# Patient Record
Sex: FEMALE | Race: WHITE | NOT HISPANIC OR LATINO | Employment: PART TIME | URBAN - METROPOLITAN AREA
[De-identification: names, ages, dates, MRNs, and addresses within clinical notes are randomized per-mention and may not be internally consistent; named-entity substitution may affect disease eponyms.]

---
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
## 2021-03-22 ENCOUNTER — OFFICE VISIT (OUTPATIENT)
Dept: PEDIATRIC HEMATOLOGY/ONCOLOGY | Facility: CLINIC | Age: 21
End: 2021-03-22
Attending: NURSE PRACTITIONER
Payer: MEDICAID

## 2021-03-22 VITALS
OXYGEN SATURATION: 100 % | SYSTOLIC BLOOD PRESSURE: 131 MMHG | WEIGHT: 198 LBS | TEMPERATURE: 98.5 F | RESPIRATION RATE: 24 BRPM | HEIGHT: 69 IN | DIASTOLIC BLOOD PRESSURE: 83 MMHG | HEART RATE: 122 BPM | BODY MASS INDEX: 29.33 KG/M2

## 2021-03-22 DIAGNOSIS — Z92.21 STATUS POST CHEMOTHERAPY: ICD-10-CM

## 2021-03-22 DIAGNOSIS — Z80.6 FAMILY HISTORY OF ACUTE LYMPHOID LEUKEMIA: ICD-10-CM

## 2021-03-22 DIAGNOSIS — M25.561 BILATERAL CHRONIC KNEE PAIN: ICD-10-CM

## 2021-03-22 DIAGNOSIS — C91.01 ACUTE LYMPHOBLASTIC LEUKEMIA (ALL) IN REMISSION (H): Primary | ICD-10-CM

## 2021-03-22 DIAGNOSIS — G89.29 BILATERAL CHRONIC KNEE PAIN: ICD-10-CM

## 2021-03-22 DIAGNOSIS — M25.562 BILATERAL CHRONIC KNEE PAIN: ICD-10-CM

## 2021-03-22 DIAGNOSIS — Z91.89 AT RISK FOR OSTEOPENIA: ICD-10-CM

## 2021-03-22 DIAGNOSIS — Z91.89 AT RISK FOR CARDIOMYOPATHY: ICD-10-CM

## 2021-03-22 LAB
ALBUMIN SERPL-MCNC: 3.8 G/DL (ref 3.4–5)
ALP SERPL-CCNC: 104 U/L (ref 40–150)
ALT SERPL W P-5'-P-CCNC: 20 U/L (ref 0–50)
ANION GAP SERPL CALCULATED.3IONS-SCNC: 6 MMOL/L (ref 3–14)
AST SERPL W P-5'-P-CCNC: 14 U/L (ref 0–35)
BASOPHILS # BLD AUTO: 0 10E9/L (ref 0–0.2)
BASOPHILS NFR BLD AUTO: 0.4 %
BILIRUB SERPL-MCNC: 0.5 MG/DL (ref 0.2–1.3)
BUN SERPL-MCNC: 10 MG/DL (ref 7–30)
CALCIUM SERPL-MCNC: 9.2 MG/DL (ref 8.5–10.1)
CHLORIDE SERPL-SCNC: 108 MMOL/L (ref 96–110)
CO2 SERPL-SCNC: 24 MMOL/L (ref 20–32)
CREAT SERPL-MCNC: 0.55 MG/DL (ref 0.5–1)
DIFFERENTIAL METHOD BLD: NORMAL
EOSINOPHIL # BLD AUTO: 0.3 10E9/L (ref 0–0.7)
EOSINOPHIL NFR BLD AUTO: 2.9 %
ERYTHROCYTE [DISTWIDTH] IN BLOOD BY AUTOMATED COUNT: 12.2 % (ref 10–15)
GFR SERPL CREATININE-BSD FRML MDRD: >90 ML/MIN/{1.73_M2}
GLUCOSE SERPL-MCNC: 113 MG/DL (ref 70–99)
HCT VFR BLD AUTO: 41 % (ref 35–47)
HGB BLD-MCNC: 13.7 G/DL (ref 11.7–15.7)
IMM GRANULOCYTES # BLD: 0 10E9/L (ref 0–0.4)
IMM GRANULOCYTES NFR BLD: 0.4 %
LYMPHOCYTES # BLD AUTO: 2 10E9/L (ref 0.8–5.3)
LYMPHOCYTES NFR BLD AUTO: 21 %
MCH RBC QN AUTO: 28.1 PG (ref 26.5–33)
MCHC RBC AUTO-ENTMCNC: 33.4 G/DL (ref 31.5–36.5)
MCV RBC AUTO: 84 FL (ref 78–100)
MONOCYTES # BLD AUTO: 0.5 10E9/L (ref 0–1.3)
MONOCYTES NFR BLD AUTO: 5.2 %
NEUTROPHILS # BLD AUTO: 6.5 10E9/L (ref 1.6–8.3)
NEUTROPHILS NFR BLD AUTO: 70.1 %
NRBC # BLD AUTO: 0 10*3/UL
NRBC BLD AUTO-RTO: 0 /100
PLATELET # BLD AUTO: 308 10E9/L (ref 150–450)
POTASSIUM SERPL-SCNC: 3.9 MMOL/L (ref 3.4–5.3)
PROT SERPL-MCNC: 7.6 G/DL (ref 6.8–8.8)
RBC # BLD AUTO: 4.87 10E12/L (ref 3.8–5.2)
SODIUM SERPL-SCNC: 138 MMOL/L (ref 133–144)
WBC # BLD AUTO: 9.3 10E9/L (ref 4–11)

## 2021-03-22 PROCEDURE — 36415 COLL VENOUS BLD VENIPUNCTURE: CPT | Performed by: NURSE PRACTITIONER

## 2021-03-22 PROCEDURE — 80053 COMPREHEN METABOLIC PANEL: CPT | Performed by: NURSE PRACTITIONER

## 2021-03-22 PROCEDURE — 99215 OFFICE O/P EST HI 40 MIN: CPT | Performed by: NURSE PRACTITIONER

## 2021-03-22 PROCEDURE — G0463 HOSPITAL OUTPT CLINIC VISIT: HCPCS

## 2021-03-22 PROCEDURE — 85025 COMPLETE CBC W/AUTO DIFF WBC: CPT | Performed by: NURSE PRACTITIONER

## 2021-03-22 ASSESSMENT — PAIN SCALES - GENERAL: PAINLEVEL: NO PAIN (0)

## 2021-03-22 ASSESSMENT — MIFFLIN-ST. JEOR: SCORE: 1737.5

## 2021-03-22 NOTE — NURSING NOTE
"Chief Complaint   Patient presents with     RECHECK     Patient is here today for ALL follow up     /83 (BP Location: Left arm, Patient Position: Fowlers, Cuff Size: Adult Large)   Pulse 122   Temp 98.5  F (36.9  C) (Oral)   Resp 24   Ht 1.753 m (5' 9\")   Wt 89.8 kg (198 lb)   SpO2 100%   BMI 29.24 kg/m      No Pain (0)  Data Unavailable    I have reviewed the patients medications and allergies    Height/weight double check needed? No    Peds Outpatient BP  1) Rested for 5 minutes, BP taken on bare arm, patient sitting (or supine for infants) w/ legs uncrossed?   Yes  2) Right arm used?  Left arm   Yes  3) Arm circumference of largest part of upper arm (in cm): 34  4) BP cuff sized used: Large Adult (32-43cm)   If used different size cuff then what was recommended why? suggested cuff is too small or short, used larger cuff size  5) First BP reading:machine   BP Readings from Last 1 Encounters:   03/22/21 131/83      Is reading >90%?No   (90% for <1 years is 90/50)  (90% for >18 years is 140/90)  *If a machine BP is at or above 90% take manual BP  6) Manual BP reading: N/A  7) Other comments: None          Maria D Shaw LPN  March 22, 2021  "

## 2021-03-22 NOTE — LETTER
"  3/22/2021      RE: Enoch Delgado  [id number] [facility] [id number]       Enoch \"Padmini\"  Danny is a 20  year old female with a history of acute lymphoblastic leukemia. She is here for her routine cancer survivorship evaluation.      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  She was on therapy until 5/27/2016.  She was treated on the [facility] protocol [id number]- VHR experimental arm 2.  She started therapy on 2/24/14 and was completed on 5/27/16.  She received the following chemotherapy on this regimen:  1.  Vincristine IV  2.  Daunorubicin IV with a cumulative dose of 100 mg/m2  3.  Prednisone PO  4.  PEG Asparaginase IV  5.  Cyclophosphamide IV with a cumulative dose of 5.4 GM/m2  6.  Cytarabine IV with a cumulative dose of 600 mg/m2; 42 mg/m2 IT  7.  6 Mercaptopurine PO  8.  Methotrexate PO, 21 GM/m2 (IV), 198 mg/m2 (IT)  9.  Erwinase IM  10.  Clofarabine IV  11.  Etoposide IV with a cumulative dose of 500 mg/m2  12.  Dexamethasone PO  13.  Doxorubicin IV with a cumulative dose of 75 mg/m2    Padmini DID NOT get radiation therapy    Ngas late effects known to date include:    1.  Prolonged neutropenia- resolved  2.  Left knee discoid lateral meniscus- 2/2015  3.  Hypogammaglobulinemia- resolved  4.  Menorrhagia  5.  Multiple lung infections- resolved    HISTORY OF PRESENT ILLNESS:  Padmini is here today with her mom and new 9 month old son.  She was last seen 7/2019.  Since her last visit, she became pregnant and gave birth to a healthy baby boy (Vadim) on 6/22/2020.  She was followed by OB and MFM.  She had a healthy pregnancy.  She has been doing well.  She has some aching in her bilateral knees off and on.  She feels like the knee pain has gotten worse as well. She did have surgery to her left knee for a torn meniscus in 2018.  She hasn't been back to orthopedics recently or had any imaging lately.  In the past she " had a negative x-ray.   She is not taking any pain medication currently and has just been dealing with the discomfort.   She is not currently on any birth control post delivery but thinking about getting an IUD placed.   She didn't like the way OCP made her feel.   She is working in an assisted living facility.  She hasn't had the COVID vaccine yet.  She has not had COVID 19 although she has taken care of patients with it and also was around her family who had it.   Antibody testing is also negative.  She is interested in meeting with genetics to talk about her son's risk for leukemia since both she and her mom had it.  She did have a cyst under her arm in the fall that was drained locally.  No HA.  No bleeding or bruising.  No fevers.  Hasn't been doing regular exercise but her job is physical.    Review of systems:  Comprehensive ROS negative except as stated in the HPI    PMH:   Past Medical History:   Diagnosis Date     ALL (acute lymphoblastic leukaemia)     very high risk (negative CNS; loss of 7q and ETV6, iAMP21 +) treated per [facility] protocol [id number]     [place] toe      PONV (postoperative nausea and vomiting)        PFMH:   Family History   Problem Relation Age of Onset     Cancer Mother 3        Acute lymphoblastic leukemia     Cancer Other         Hodgkin Lymphoma; Great Aunt     Diabetes Maternal Grandmother      Coronary Artery Disease Maternal Grandfather      Myocardial Infarction Maternal Grandfather        Social History: Padmini graduated from high school in 2019.  She started community college to pursue an associates degree in nursing.  She did take last year off due to her pregnancy but is back in school now.    Had a healthy baby boy in 6/2020.    Current Medications:   Current Outpatient Medications   Medication     cholecalciferol (VITAMIN D3) 10 mcg (400 units) TABS tablet     sertraline (ZOLOFT) 50 MG tablet     diclofenac (VOLTAREN) 75 MG EC tablet     No current facility-administered  "medications for this visit.        Physical Exam: /83 (BP Location: Left arm, Patient Position: Fowlers, Cuff Size: Adult Large)   Pulse 122   Temp 98.5  F (36.9  C) (Oral)   Resp 24   Ht 1.753 m (5' 9\")   Wt 89.8 kg (198 lb)   SpO2 100%   BMI 29.24 kg/m       Wt Readings from Last 3 Encounters:   03/22/21 89.8 kg (198 lb) (97 %, Z= 1.91)*   07/30/19 95.1 kg (209 lb 10.5 oz) (98 %, Z= 2.10)*   04/02/19 94.1 kg (207 lb 7.3 oz) (98 %, Z= 2.08)*     * Growth percentiles are based on CDC (Girls, 2-20 Years) data.     Ht Readings from Last 2 Encounters:   03/22/21 1.753 m (5' 9\") (97 %, Z= 1.85)*   07/30/19 1.759 m (5' 9.25\") (98 %, Z= 1.98)*     * Growth percentiles are based on CDC (Girls, 2-20 Years) data.     92 %ile (Z= 1.43) based on CDC (Girls, 2-20 Years) BMI-for-age based on BMI available as of 3/22/2021.      General: Enoch Delgado is alert, interactive and appropriate for age throughout exam.    Karnofsky/Lansky score is 100  HEENT: Skull is atrauamatic and normocephalic. PERRLA, sclera are non icteric and not injected, EOM are intact.  Nares are patent without drainage.  Oropharynx is clear without exudate, erythema or lesions.  Tympanic membranes are opaque bilaterally with light reflex and landmarks present.  Lymph:  Neck is supple without lymphadenopathy.  There is no supraclavicular, axillay or inguinal lymphadenopathy palpated.  Cardiovascular:  HR is regular, S1, S2 no murmur.  Capillary refill is < 2 seconds.  There is no edema.  Respiratory: Respirations are easy.  Lungs are clear to auscultation through out.  No crackles or wheezes.  Gastrointestinal:  BS present in all quadrants.  Abdomen is soft and non-tender.  No hepatosplenomegaly or masses are palpated.  Genitourinary: deferred  Skin: No rashes, bruises or other skin lesions are noted.   Neurological:  DTR are present and equal at patellas bilaterally.  Gait is normal.  Sensation intact in hands and feet.  Musculoskeletal:  Good " strength and ROM in all extremities.  Strong dorsiflexion at ankles bilaterally without any pain at the Achilles.    Labs:     Results for orders placed or performed in visit on 03/22/21   CBC with platelets differential     Status: None   Result Value Ref Range    WBC 9.3 4.0 - 11.0 10e9/L    RBC Count 4.87 3.8 - 5.2 10e12/L    Hemoglobin 13.7 11.7 - 15.7 g/dL    Hematocrit 41.0 35.0 - 47.0 %    MCV 84 78 - 100 fl    MCH 28.1 26.5 - 33.0 pg    MCHC 33.4 31.5 - 36.5 g/dL    RDW 12.2 10.0 - 15.0 %    Platelet Count 308 150 - 450 10e9/L    Diff Method Automated Method     % Neutrophils 70.1 %    % Lymphocytes 21.0 %    % Monocytes 5.2 %    % Eosinophils 2.9 %    % Basophils 0.4 %    % Immature Granulocytes 0.4 %    Nucleated RBCs 0 0 /100    Absolute Neutrophil 6.5 1.6 - 8.3 10e9/L    Absolute Lymphocytes 2.0 0.8 - 5.3 10e9/L    Absolute Monocytes 0.5 0.0 - 1.3 10e9/L    Absolute Eosinophils 0.3 0.0 - 0.7 10e9/L    Absolute Basophils 0.0 0.0 - 0.2 10e9/L    Abs Immature Granulocytes 0.0 0 - 0.4 10e9/L    Absolute Nucleated RBC 0.0    Comprehensive metabolic panel     Status: Abnormal   Result Value Ref Range    Sodium 138 133 - 144 mmol/L    Potassium 3.9 3.4 - 5.3 mmol/L    Chloride 108 96 - 110 mmol/L    Carbon Dioxide 24 20 - 32 mmol/L    Anion Gap 6 3 - 14 mmol/L    Glucose 113 (H) 70 - 99 mg/dL    Urea Nitrogen 10 7 - 30 mg/dL    Creatinine 0.55 0.50 - 1.00 mg/dL    GFR Estimate >90 >60 mL/min/[1.73_m2]    GFR Estimate If Black >90 >60 mL/min/[1.73_m2]    Calcium 9.2 8.5 - 10.1 mg/dL    Bilirubin Total 0.5 0.2 - 1.3 mg/dL    Albumin 3.8 3.4 - 5.0 g/dL    Protein Total 7.6 6.8 - 8.8 g/dL    Alkaline Phosphatase 104 40 - 150 U/L    ALT 20 0 - 50 U/L    AST 14 0 - 35 U/L         Radiology:  None today    Assessment:  Enoch Delgado is a 20 year female with a history of B cell ALL s/p chemotherapy.  She has been off therapy > 4 1/2 years and here for her routine cancer survivorship evaluation.  She is doing well  post therapy with no clinical evidence of disease recurrence.  She had a healthy baby boy last summer.  She is having more issues with her bilateral knees having pain.  She also would like to meet with genetic counseling to discuss testing for her son since both she and her mom had leukemia.   She does have an elevated BMI and should work to increase physical activity.  The following are our long term follow up recommendations.    New late effects:  Chronic bilateral knee pain    Plan:     1.  RISK OF RECURRENCE: Padmini is 4 1/2 years off therapy from ALL therapy.  CBC today is normal.  I recommend that she come back every 6 months and then we will transition her to yearly visits.    2.  PSYCHOSOCIAL EFFECTS:  Padmini has a history of chemotherapy that can affect cognition and learning.  She had formal neuropsych testing a few years ago.  She reports doing well in school and attending college.  I recommend that she repeat testing as needed.  I discussed that she could talk with the office of disability services about getting academic accommodations.  She also met with social work today.  Please see their notation for details.    3.   RISK FOR PERIPHERAL NEUROPATHY:  Padmini has a history of vincristine and risk for neuropathy.  No signs on exam today.    4.  RISK FOR CARDIAC TOXICITY:  Padmini has a history of anthracycline chemotherapy and risk for cardiomyopathy.  She had an echo in 2016 that was normal.  She needs an echo every 5 years.  We will repeat this in 2021.   I have scheduled that for 9/2021.    There is also a risk for metabolic syndrome after leukemia therapy.  In the future, Padmini needs a fasting BG and lipids every 2-3 years.  We did screen with a hemoglobin A1C that was normal.  I did discuss that she should eat a heart healthy diet and increase her physical activity given her BMI and for overall general health.  We will continue to follow this.    5.  BONE HEALTH:  Padmini has a history of steroids and  methotrexate which places her at risk for osteopenia.  She should have a baseline DEXA at the next visit.  If normal, we will repeat as needed. She has been having more knee pain.  I ordered a bilateral knee MRI and visit with Dr. Manrique to follow.    6.  FEMALE ISSUES RELATED TO FERTILITY: Padmini had a moderate amount of alkylating agents so is at risk for diminished ovarian reserve and fertility issues.  Has not had any fertility issues and had a healthy baby boy 6/2020.    7.  RISK FOR SECONDARY NEOPLASMS:  Padmini has a history of chemotherapy that places her at risk for secondary myelodysplasia.  We recommend that she have at least yearly CBCs until 10 years off therapy.  She should wear high SPF sunscreen when outdoors.      8.  GENETICS:  Padmini and her mom both had ALL and genetic testing in the past.  No known genetic mutations were found.  We will continue follow her family history.  She would like testing for her son and I did put in a counseling referral for that discussion.    9. RISK FOR RENAL TOXICITY:  Padmini had chemotherapy which can place her at risk for kidney toxicity. She should have yearly BP and urinary history.  We checked a baseline CMP that was normal.      10.  RISK FOR LIVER TOXICITY:  Padmini has a history of chemotherapy which can affect her liver function.  She had a baseline CMP that was normal.    It was a pleasure meeting with Padmini today.  We appreciate the opportunity to participate in her care.  If you have any questions or concerns, I can be reached at [phone].  We ask that Padmini return to see us in 6 months with an echo. She will have a DEXA, bilateral knee MRI and visit with ortho in the near future.      Meron Burton MSN, APRN, CPNP-AC, CPON  Department of Pediatrics  Division of Hematology/Oncology    Review of the result(s) of each unique test - CBC, CMP  Assessment requiring an independent historian(s) - family - mother  40 minutes spent on the date of the encounter doing chart  review, history and exam, documentation and further activities as noted above        KAMAR Nieves CNP

## 2021-03-22 NOTE — PROGRESS NOTES
"Enoch \"Padmini\"  Danny is a 20  year old female with a history of acute lymphoblastic leukemia. She is here for her routine cancer survivorship evaluation.      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  She was on therapy until 5/27/2016.  She was treated on the [facility] protocol [id number]- VHR experimental arm 2.  She started therapy on 2/24/14 and was completed on 5/27/16.  She received the following chemotherapy on this regimen:  1.  Vincristine IV  2.  Daunorubicin IV with a cumulative dose of 100 mg/m2  3.  Prednisone PO  4.  PEG Asparaginase IV  5.  Cyclophosphamide IV with a cumulative dose of 5.4 GM/m2  6.  Cytarabine IV with a cumulative dose of 600 mg/m2; 42 mg/m2 IT  7.  6 Mercaptopurine PO  8.  Methotrexate PO, 21 GM/m2 (IV), 198 mg/m2 (IT)  9.  Erwinase IM  10.  Clofarabine IV  11.  Etoposide IV with a cumulative dose of 500 mg/m2  12.  Dexamethasone PO  13.  Doxorubicin IV with a cumulative dose of 75 mg/m2    Padmini DID NOT get radiation therapy    Padmini's late effects known to date include:    1.  Prolonged neutropenia- resolved  2.  Left knee discoid lateral meniscus- 2/2015  3.  Hypogammaglobulinemia- resolved  4.  Menorrhagia  5.  Multiple lung infections- resolved    HISTORY OF PRESENT ILLNESS:  Padmini is here today with her mom and new 9 month old son.  She was last seen 7/2019.  Since her last visit, she became pregnant and gave birth to a healthy baby boy (Vadim) on 6/22/2020.  She was followed by OB and MFM.  She had a healthy pregnancy.  She has been doing well.  She has some aching in her bilateral knees off and on.  She feels like the knee pain has gotten worse as well. She did have surgery to her left knee for a torn meniscus in 2018.  She hasn't been back to orthopedics recently or had any imaging lately.  In the past she had a negative x-ray.   She is not taking any pain medication currently and has just " been dealing with the discomfort.   She is not currently on any birth control post delivery but thinking about getting an IUD placed.   She didn't like the way OCP made her feel.   She is working in an assisted living facility.  She hasn't had the COVID vaccine yet.  She has not had COVID 19 although she has taken care of patients with it and also was around her family who had it.   Antibody testing is also negative.  She is interested in meeting with genetics to talk about her son's risk for leukemia since both she and her mom had it.  She did have a cyst under her arm in the fall that was drained locally.  No HA.  No bleeding or bruising.  No fevers.  Hasn't been doing regular exercise but her job is physical.    Review of systems:  Comprehensive ROS negative except as stated in the HPI    PMH:   Past Medical History:   Diagnosis Date     ALL (acute lymphoblastic leukaemia)     very high risk (negative CNS; loss of 7q and ETV6, iAMP21 +) treated per [facility] protocol [id number]     [place] toe      PONV (postoperative nausea and vomiting)        PFMH:   Family History   Problem Relation Age of Onset     Cancer Mother 3        Acute lymphoblastic leukemia     Cancer Other         Hodgkin Lymphoma; Great Aunt     Diabetes Maternal Grandmother      Coronary Artery Disease Maternal Grandfather      Myocardial Infarction Maternal Grandfather        Social History: Padmini graduated from high school in 2019.  She started community college to pursue an associates degree in nursing.  She did take last year off due to her pregnancy but is back in school now.    Had a healthy baby boy in 6/2020.    Current Medications:   Current Outpatient Medications   Medication     cholecalciferol (VITAMIN D3) 10 mcg (400 units) TABS tablet     sertraline (ZOLOFT) 50 MG tablet     diclofenac (VOLTAREN) 75 MG EC tablet     No current facility-administered medications for this visit.        Physical Exam: /83 (BP Location: Left arm, Patient  "Position: Fowlers, Cuff Size: Adult Large)   Pulse 122   Temp 98.5  F (36.9  C) (Oral)   Resp 24   Ht 1.753 m (5' 9\")   Wt 89.8 kg (198 lb)   SpO2 100%   BMI 29.24 kg/m       Wt Readings from Last 3 Encounters:   03/22/21 89.8 kg (198 lb) (97 %, Z= 1.91)*   07/30/19 95.1 kg (209 lb 10.5 oz) (98 %, Z= 2.10)*   04/02/19 94.1 kg (207 lb 7.3 oz) (98 %, Z= 2.08)*     * Growth percentiles are based on CDC (Girls, 2-20 Years) data.     Ht Readings from Last 2 Encounters:   03/22/21 1.753 m (5' 9\") (97 %, Z= 1.85)*   07/30/19 1.759 m (5' 9.25\") (98 %, Z= 1.98)*     * Growth percentiles are based on CDC (Girls, 2-20 Years) data.     92 %ile (Z= 1.43) based on CDC (Girls, 2-20 Years) BMI-for-age based on BMI available as of 3/22/2021.      General: Enoch Delgado is alert, interactive and appropriate for age throughout exam.    Karnofsky/Lansky score is 100  HEENT: Skull is atrauamatic and normocephalic. PERRLA, sclera are non icteric and not injected, EOM are intact.  Nares are patent without drainage.  Oropharynx is clear without exudate, erythema or lesions.  Tympanic membranes are opaque bilaterally with light reflex and landmarks present.  Lymph:  Neck is supple without lymphadenopathy.  There is no supraclavicular, axillay or inguinal lymphadenopathy palpated.  Cardiovascular:  HR is regular, S1, S2 no murmur.  Capillary refill is < 2 seconds.  There is no edema.  Respiratory: Respirations are easy.  Lungs are clear to auscultation through out.  No crackles or wheezes.  Gastrointestinal:  BS present in all quadrants.  Abdomen is soft and non-tender.  No hepatosplenomegaly or masses are palpated.  Genitourinary: deferred  Skin: No rashes, bruises or other skin lesions are noted.   Neurological:  DTR are present and equal at patellas bilaterally.  Gait is normal.  Sensation intact in hands and feet.  Musculoskeletal:  Good strength and ROM in all extremities.  Strong dorsiflexion at ankles bilaterally without any " pain at the Achilles.    Labs:     Results for orders placed or performed in visit on 03/22/21   CBC with platelets differential     Status: None   Result Value Ref Range    WBC 9.3 4.0 - 11.0 10e9/L    RBC Count 4.87 3.8 - 5.2 10e12/L    Hemoglobin 13.7 11.7 - 15.7 g/dL    Hematocrit 41.0 35.0 - 47.0 %    MCV 84 78 - 100 fl    MCH 28.1 26.5 - 33.0 pg    MCHC 33.4 31.5 - 36.5 g/dL    RDW 12.2 10.0 - 15.0 %    Platelet Count 308 150 - 450 10e9/L    Diff Method Automated Method     % Neutrophils 70.1 %    % Lymphocytes 21.0 %    % Monocytes 5.2 %    % Eosinophils 2.9 %    % Basophils 0.4 %    % Immature Granulocytes 0.4 %    Nucleated RBCs 0 0 /100    Absolute Neutrophil 6.5 1.6 - 8.3 10e9/L    Absolute Lymphocytes 2.0 0.8 - 5.3 10e9/L    Absolute Monocytes 0.5 0.0 - 1.3 10e9/L    Absolute Eosinophils 0.3 0.0 - 0.7 10e9/L    Absolute Basophils 0.0 0.0 - 0.2 10e9/L    Abs Immature Granulocytes 0.0 0 - 0.4 10e9/L    Absolute Nucleated RBC 0.0    Comprehensive metabolic panel     Status: Abnormal   Result Value Ref Range    Sodium 138 133 - 144 mmol/L    Potassium 3.9 3.4 - 5.3 mmol/L    Chloride 108 96 - 110 mmol/L    Carbon Dioxide 24 20 - 32 mmol/L    Anion Gap 6 3 - 14 mmol/L    Glucose 113 (H) 70 - 99 mg/dL    Urea Nitrogen 10 7 - 30 mg/dL    Creatinine 0.55 0.50 - 1.00 mg/dL    GFR Estimate >90 >60 mL/min/[1.73_m2]    GFR Estimate If Black >90 >60 mL/min/[1.73_m2]    Calcium 9.2 8.5 - 10.1 mg/dL    Bilirubin Total 0.5 0.2 - 1.3 mg/dL    Albumin 3.8 3.4 - 5.0 g/dL    Protein Total 7.6 6.8 - 8.8 g/dL    Alkaline Phosphatase 104 40 - 150 U/L    ALT 20 0 - 50 U/L    AST 14 0 - 35 U/L         Radiology:  None today    Assessment:  Enoch Delgado is a 20 year female with a history of B cell ALL s/p chemotherapy.  She has been off therapy > 4 1/2 years and here for her routine cancer survivorship evaluation.  She is doing well post therapy with no clinical evidence of disease recurrence.  She had a healthy baby boy last  summer.  She is having more issues with her bilateral knees having pain.  She also would like to meet with genetic counseling to discuss testing for her son since both she and her mom had leukemia.   She does have an elevated BMI and should work to increase physical activity.  The following are our long term follow up recommendations.    New late effects:  Chronic bilateral knee pain    Plan:     1.  RISK OF RECURRENCE: Padmini is 4 1/2 years off therapy from ALL therapy.  CBC today is normal.  I recommend that she come back every 6 months and then we will transition her to yearly visits.    2.  PSYCHOSOCIAL EFFECTS:  Padmini has a history of chemotherapy that can affect cognition and learning.  She had formal neuropsych testing a few years ago.  She reports doing well in school and attending college.  I recommend that she repeat testing as needed.  I discussed that she could talk with the office of disability services about getting academic accommodations.  She also met with social work today.  Please see their notation for details.    3.   RISK FOR PERIPHERAL NEUROPATHY:  Padmini has a history of vincristine and risk for neuropathy.  No signs on exam today.    4.  RISK FOR CARDIAC TOXICITY:  Padmini has a history of anthracycline chemotherapy and risk for cardiomyopathy.  She had an echo in 2016 that was normal.  She needs an echo every 5 years.  We will repeat this in 2021.   I have scheduled that for 9/2021.    There is also a risk for metabolic syndrome after leukemia therapy.  In the future, Padmini needs a fasting BG and lipids every 2-3 years.  We did screen with a hemoglobin A1C that was normal.  I did discuss that she should eat a heart healthy diet and increase her physical activity given her BMI and for overall general health.  We will continue to follow this.    5.  BONE HEALTH:  Padmini has a history of steroids and methotrexate which places her at risk for osteopenia.  She should have a baseline DEXA at the next visit.   If normal, we will repeat as needed. She has been having more knee pain.  I ordered a bilateral knee MRI and visit with Dr. Manrique to follow.    6.  FEMALE ISSUES RELATED TO FERTILITY: Padmini had a moderate amount of alkylating agents so is at risk for diminished ovarian reserve and fertility issues.  Has not had any fertility issues and had a healthy baby boy 6/2020.    7.  RISK FOR SECONDARY NEOPLASMS:  Padmini has a history of chemotherapy that places her at risk for secondary myelodysplasia.  We recommend that she have at least yearly CBCs until 10 years off therapy.  She should wear high SPF sunscreen when outdoors.      8.  GENETICS:  Padmini and her mom both had ALL and genetic testing in the past.  No known genetic mutations were found.  We will continue follow her family history.  She would like testing for her son and I did put in a counseling referral for that discussion.    9. RISK FOR RENAL TOXICITY:  Padmini had chemotherapy which can place her at risk for kidney toxicity. She should have yearly BP and urinary history.  We checked a baseline CMP that was normal.      10.  RISK FOR LIVER TOXICITY:  Padmini has a history of chemotherapy which can affect her liver function.  She had a baseline CMP that was normal.    It was a pleasure meeting with Padmini today.  We appreciate the opportunity to participate in her care.  If you have any questions or concerns, I can be reached at [phone].  We ask that Padmini return to see us in 6 months with an echo. She will have a DEXA, bilateral knee MRI and visit with ortho in the near future.      Meron Burton MSN, APRN, CPNP-AC, CPON  Department of Pediatrics  Division of Hematology/Oncology    Review of the result(s) of each unique test - CBC, CMP  Assessment requiring an independent historian(s) - family - mother  40 minutes spent on the date of the encounter doing chart review, history and exam, documentation and further activities as noted above

## 2021-04-02 ENCOUNTER — ANCILLARY PROCEDURE (OUTPATIENT)
Dept: BONE DENSITY | Facility: CLINIC | Age: 21
End: 2021-04-02
Attending: NURSE PRACTITIONER
Payer: MEDICAID

## 2021-04-02 ENCOUNTER — HOSPITAL ENCOUNTER (OUTPATIENT)
Dept: MRI IMAGING | Facility: CLINIC | Age: 21
End: 2021-04-02
Attending: NURSE PRACTITIONER
Payer: MEDICAID

## 2021-04-02 DIAGNOSIS — Z92.21 STATUS POST CHEMOTHERAPY: ICD-10-CM

## 2021-04-02 DIAGNOSIS — G89.29 BILATERAL CHRONIC KNEE PAIN: ICD-10-CM

## 2021-04-02 DIAGNOSIS — M25.562 BILATERAL CHRONIC KNEE PAIN: ICD-10-CM

## 2021-04-02 DIAGNOSIS — C91.01 ACUTE LYMPHOBLASTIC LEUKEMIA (ALL) IN REMISSION (H): ICD-10-CM

## 2021-04-02 DIAGNOSIS — Z91.89 AT RISK FOR OSTEOPENIA: ICD-10-CM

## 2021-04-02 DIAGNOSIS — M25.561 BILATERAL CHRONIC KNEE PAIN: ICD-10-CM

## 2021-04-02 PROCEDURE — A9585 GADOBUTROL INJECTION: HCPCS | Performed by: NURSE PRACTITIONER

## 2021-04-02 PROCEDURE — 77080 DXA BONE DENSITY AXIAL: CPT

## 2021-04-02 PROCEDURE — 73723 MRI JOINT LWR EXTR W/O&W/DYE: CPT | Mod: LT

## 2021-04-02 PROCEDURE — 73723 MRI JOINT LWR EXTR W/O&W/DYE: CPT | Mod: 26 | Performed by: RADIOLOGY

## 2021-04-02 PROCEDURE — 73723 MRI JOINT LWR EXTR W/O&W/DYE: CPT | Mod: 50

## 2021-04-02 PROCEDURE — 255N000002 HC RX 255 OP 636: Performed by: NURSE PRACTITIONER

## 2021-04-02 PROCEDURE — 77080 DXA BONE DENSITY AXIAL: CPT | Mod: 26 | Performed by: RADIOLOGY

## 2021-04-02 RX ORDER — GADOBUTROL 604.72 MG/ML
10 INJECTION INTRAVENOUS ONCE
Status: COMPLETED | OUTPATIENT
Start: 2021-04-02 | End: 2021-04-02

## 2021-04-02 RX ADMIN — GADOBUTROL 9 ML: 604.72 INJECTION INTRAVENOUS at 12:19

## 2021-04-05 ENCOUNTER — OFFICE VISIT (OUTPATIENT)
Dept: ORTHOPEDICS | Facility: CLINIC | Age: 21
End: 2021-04-05
Payer: MEDICAID

## 2021-04-05 VITALS — HEIGHT: 69 IN | BODY MASS INDEX: 29.33 KG/M2 | WEIGHT: 198 LBS

## 2021-04-05 DIAGNOSIS — M25.561 ACUTE PAIN OF RIGHT KNEE: ICD-10-CM

## 2021-04-05 DIAGNOSIS — M25.562 ACUTE PAIN OF LEFT KNEE: Primary | ICD-10-CM

## 2021-04-05 PROCEDURE — 99213 OFFICE O/P EST LOW 20 MIN: CPT | Performed by: ORTHOPAEDIC SURGERY

## 2021-04-05 ASSESSMENT — MIFFLIN-ST. JEOR: SCORE: 1737.5

## 2021-04-05 NOTE — LETTER
4/5/2021         RE: Enoch Delgado  [address] [address] [place] [id number]        Dear Colleague,    Thank you for referring your patient, Enoch Delgado, to the [facility] [place]. Please see a copy of my visit note below.    Enoch Delgado is a very pleasant 20-year-old female.  Well-known to me.  I initially met her back in 2013/2014 at that time she was undergoing cancer treatment for a history of ALL.  She also had a discoid meniscus.  I performed a left knee discoid meniscus saucerization in 2014 and a revision saucerization/meniscectomy in 2017.  She initially did very well my last visit with her was in 2018 at that time I felt that overall her knee was stable it was found to be structurally sound.  She had done well from her cancer perspective.  She recently discussed with her primary care physician who provides lifetime management given her history of leukemia and they obtained MRIs of both knees she follows up in clinic to discuss.  She has noted some intermittent pain.  She does not really want surgery right now.    Examination of her left knee today shows full range of motion.  Lachman 0, no pivot shift.  Stable to varus and valgus stress testing.  Stable anterior posterior drawer testing.  Examination of the right knee today shows full range of motion.  Lachman 0, no pivot shift.  Neurovascular intact    MRI of her left knee today shows evidence of a prior saucerization of a discoid meniscus.  No definite new meniscus tears and overall her meniscus appears appropriate in location and volume.  Exam MRI examination of her right knee today does demonstrate a large discoid meniscus without evidence of tear    Clinical assessment: Left knee discoid meniscus and revision saucerization in 2014 and 2017.  Currently structurally sound.     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  Doing well.    Plan: I had a long discussion with the patient this  time I think her knee is structurally sound.  She is cleared for all activities.  I have no surgical targets at this time would continue to encourage her to pursue a healthy and active lifestyle take oral anti-inflammatories as Tylenol as necessary.  She has no specific restrictions and I wish her the very best going forward certainly be happy to see her again as needed if she needs anything.      Again, thank you for allowing me to participate in the care of your patient.        Sincerely,        Vivek Manrique MD

## 2021-04-06 NOTE — PROGRESS NOTES
Enoch Delgaod is a very pleasant 20-year-old female.  Well-known to me.  I initially met her back in 2013/2014 at that time she was undergoing cancer treatment for a history of ALL.  She also had a discoid meniscus.  I performed a left knee discoid meniscus saucerization in 2014 and a revision saucerization/meniscectomy in 2017.  She initially did very well my last visit with her was in 2018 at that time I felt that overall her knee was stable it was found to be structurally sound.  She had done well from her cancer perspective.  She recently discussed with her primary care physician who provides lifetime management given her history of leukemia and they obtained MRIs of both knees she follows up in clinic to discuss.  She has noted some intermittent pain.  She does not really want surgery right now.    Examination of her left knee today shows full range of motion.  Lachman 0, no pivot shift.  Stable to varus and valgus stress testing.  Stable anterior posterior drawer testing.  Examination of the right knee today shows full range of motion.  Lachman 0, no pivot shift.  Neurovascular intact    MRI of her left knee today shows evidence of a prior saucerization of a discoid meniscus.  No definite new meniscus tears and overall her meniscus appears appropriate in location and volume.  Exam MRI examination of her right knee today does demonstrate a large discoid meniscus without evidence of tear    Clinical assessment: Left knee discoid meniscus and revision saucerization in 2014 and 2017.  Currently structurally sound.     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  Doing well.    Plan: I had a long discussion with the patient this time I think her knee is structurally sound.  She is cleared for all activities.  I have no surgical targets at this time would continue to encourage her to pursue a healthy and active lifestyle take oral anti-inflammatories as Tylenol as necessary.  She has no specific  restrictions and I wish her the very best going forward certainly be happy to see her again as needed if she needs anything.

## 2021-04-24 ENCOUNTER — HEALTH MAINTENANCE LETTER (OUTPATIENT)
Age: 21
End: 2021-04-24

## 2021-09-14 ENCOUNTER — HOSPITAL ENCOUNTER (OUTPATIENT)
Dept: CARDIOLOGY | Facility: CLINIC | Age: 21
End: 2021-09-14
Attending: NURSE PRACTITIONER
Payer: MEDICAID

## 2021-09-14 ENCOUNTER — OFFICE VISIT (OUTPATIENT)
Dept: PEDIATRIC HEMATOLOGY/ONCOLOGY | Facility: CLINIC | Age: 21
End: 2021-09-14
Attending: NURSE PRACTITIONER
Payer: MEDICAID

## 2021-09-14 VITALS
SYSTOLIC BLOOD PRESSURE: 135 MMHG | HEART RATE: 118 BPM | DIASTOLIC BLOOD PRESSURE: 79 MMHG | BODY MASS INDEX: 30.01 KG/M2 | HEIGHT: 69 IN | TEMPERATURE: 98.1 F | RESPIRATION RATE: 18 BRPM | OXYGEN SATURATION: 98 % | WEIGHT: 202.6 LBS

## 2021-09-14 DIAGNOSIS — C91.01 ACUTE LYMPHOBLASTIC LEUKEMIA (ALL) IN REMISSION (H): Primary | ICD-10-CM

## 2021-09-14 DIAGNOSIS — Z91.89 AT RISK FOR CARDIOMYOPATHY: ICD-10-CM

## 2021-09-14 DIAGNOSIS — Z92.21 STATUS POST CHEMOTHERAPY: ICD-10-CM

## 2021-09-14 DIAGNOSIS — C91.01 ACUTE LYMPHOBLASTIC LEUKEMIA (ALL) IN REMISSION (H): ICD-10-CM

## 2021-09-14 DIAGNOSIS — Z80.6 FAMILY HISTORY OF ACUTE LYMPHOID LEUKEMIA: ICD-10-CM

## 2021-09-14 LAB
BASOPHILS # BLD AUTO: 0.1 10E3/UL (ref 0–0.2)
BASOPHILS NFR BLD AUTO: 1 %
EOSINOPHIL # BLD AUTO: 0.3 10E3/UL (ref 0–0.7)
EOSINOPHIL NFR BLD AUTO: 2 %
ERYTHROCYTE [DISTWIDTH] IN BLOOD BY AUTOMATED COUNT: 12.7 % (ref 10–15)
HCT VFR BLD AUTO: 43 % (ref 35–47)
HGB BLD-MCNC: 14.1 G/DL (ref 11.7–15.7)
IMM GRANULOCYTES # BLD: 0 10E3/UL
IMM GRANULOCYTES NFR BLD: 0 %
LYMPHOCYTES # BLD AUTO: 2.9 10E3/UL (ref 0.8–5.3)
LYMPHOCYTES NFR BLD AUTO: 23 %
MCH RBC QN AUTO: 27.7 PG (ref 26.5–33)
MCHC RBC AUTO-ENTMCNC: 32.8 G/DL (ref 31.5–36.5)
MCV RBC AUTO: 85 FL (ref 78–100)
MONOCYTES # BLD AUTO: 0.7 10E3/UL (ref 0–1.3)
MONOCYTES NFR BLD AUTO: 6 %
NEUTROPHILS # BLD AUTO: 8.6 10E3/UL (ref 1.6–8.3)
NEUTROPHILS NFR BLD AUTO: 68 %
NRBC # BLD AUTO: 0 10E3/UL
NRBC BLD AUTO-RTO: 0 /100
PLATELET # BLD AUTO: 353 10E3/UL (ref 150–450)
RBC # BLD AUTO: 5.09 10E6/UL (ref 3.8–5.2)
WBC # BLD AUTO: 12.6 10E3/UL (ref 4–11)

## 2021-09-14 PROCEDURE — 85025 COMPLETE CBC W/AUTO DIFF WBC: CPT | Performed by: NURSE PRACTITIONER

## 2021-09-14 PROCEDURE — 93306 TTE W/DOPPLER COMPLETE: CPT

## 2021-09-14 PROCEDURE — G0463 HOSPITAL OUTPT CLINIC VISIT: HCPCS | Mod: 25

## 2021-09-14 PROCEDURE — G0463 HOSPITAL OUTPT CLINIC VISIT: HCPCS

## 2021-09-14 PROCEDURE — 99215 OFFICE O/P EST HI 40 MIN: CPT | Performed by: NURSE PRACTITIONER

## 2021-09-14 PROCEDURE — 36415 COLL VENOUS BLD VENIPUNCTURE: CPT | Performed by: NURSE PRACTITIONER

## 2021-09-14 PROCEDURE — 93306 TTE W/DOPPLER COMPLETE: CPT | Mod: 26 | Performed by: PEDIATRICS

## 2021-09-14 ASSESSMENT — PAIN SCALES - GENERAL: PAINLEVEL: NO PAIN (0)

## 2021-09-14 ASSESSMENT — MIFFLIN-ST. JEOR: SCORE: 1757.38

## 2021-09-14 NOTE — NURSING NOTE
"Chief Complaint   Patient presents with     Follow Up     LTFU     /79   Pulse 118   Temp 98.1  F (36.7  C) (Oral)   Resp 18   Ht 1.759 m (5' 9.25\")   Wt 91.9 kg (202 lb 9.6 oz)   SpO2 98%   BMI 29.70 kg/m      No Pain (0)  Data Unavailable    I have reviewed the patients medications and allergies    Height/weight double check needed? No    Peds Outpatient BP  1) Rested for 5 minutes, BP taken on bare arm, patient sitting (or supine for infants) w/ legs uncrossed?   Yes  2) Right arm used?      Yes  3) Arm circumference of largest part of upper arm (in cm):  30cm  4) BP cuff sized used: Adult (25-32cm)   If used different size cuff then what was recommended why? N/A  5) First BP reading:machine   BP Readings from Last 1 Encounters:   09/14/21 135/79      Is reading >90%?No   (90% for <1 years is 90/50)  (90% for >18 years is 140/90)  *If a machine BP is at or above 90% take manual BP  6) Manual BP reading: N/A  7) Other comments: None          Yumiko Rincon, AMILCAR  September 14, 2021  "

## 2021-09-14 NOTE — LETTER
"  9/14/2021      RE: Enoch Delgado  [address] [address] [facility] [id number]       Enoch \"Padmini\"  Danny is a 21  year old female with a history of acute lymphoblastic leukemia. She is here for her routine cancer survivorship evaluation.      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  She was on therapy until 5/27/2016.  She was treated on the [facility] protocol [id number]- VHR experimental arm 2.  She started therapy on 2/24/14 and was completed on 5/27/16.  She received the following chemotherapy on this regimen:  1.  Vincristine IV  2.  Daunorubicin IV with a cumulative dose of 100 mg/m2  3.  Prednisone PO  4.  PEG Asparaginase IV  5.  Cyclophosphamide IV with a cumulative dose of 5.4 GM/m2  6.  Cytarabine IV with a cumulative dose of 600 mg/m2; 42 mg/m2 IT  7.  6 Mercaptopurine PO  8.  Methotrexate PO, 21 GM/m2 (IV), 198 mg/m2 (IT)  9.  Erwinase IM  10.  Clofarabine IV  11.  Etoposide IV with a cumulative dose of 500 mg/m2  12.  Dexamethasone PO  13.  Doxorubicin IV with a cumulative dose of 75 mg/m2    Padmini DID NOT get radiation therapy    Padmini's late effects known to date include:    1.  Prolonged neutropenia- resolved  2.  Left knee discoid lateral meniscus- 2/2015  3.  Hypogammaglobulinemia- resolved  4.  Menorrhagia  5.  Multiple lung infections- resolved    HISTORY OF PRESENT ILLNESS:  Padmini is here today with her mom and new 15 month old son.  She was last seen last year.  Since her last visit, she became pregnant and gave birth to a healthy baby boy (Vadim) on 6/22/2020.  She was followed by OB and MFM.  She had a healthy pregnancy.  She has been doing well.  She has some aching in her bilateral knees off and on.  She did see Dr. Manrique in ortho.  The pain is the same.  She has not decided about whether to get surgery now.  She did have surgery to her left knee for a torn meniscus in 2018.     She is not taking any " "pain medication currently and has just been dealing with the discomfort.      She is working in an assisted living facility.  She hasn't had the COVID vaccine yet.  She has not had COVID 19 although she has taken care of patients with it and also was around her family who had it.   Antibody testing is also negative.  She does have an allergy to PEG which is a component of the Moderna and Pfizer vaccines.   She is interested in meeting with genetics to talk about her son's risk for leukemia since both she and her mom had it.  No HA.  No bleeding or bruising.  No fevers.  Hasn't been doing regular exercise but her job is physical.  Hasn't been sick lately.    Review of systems:  Comprehensive ROS negative except as stated in the HPI    PMH:   Past Medical History:   Diagnosis Date     ALL (acute lymphoblastic leukaemia)     very high risk (negative CNS; loss of 7q and ETV6, iAMP21 +) treated per [facility] protocol [id number]     Speer toe      PONV (postoperative nausea and vomiting)        PFMH:   Family History   Problem Relation Age of Onset     Cancer Mother 3        Acute lymphoblastic leukemia     Cancer Other         Hodgkin Lymphoma; Great Aunt     Diabetes Maternal Grandmother      Coronary Artery Disease Maternal Grandfather      Myocardial Infarction Maternal Grandfather        Social History: Padmini graduated from high school in 2019.  She started community college to pursue an associates degree in nursing.  She did take last year off due to her pregnancy but is back in school now.    Had a healthy baby boy in 6/2020.    Current Medications:   Current Outpatient Medications   Medication     cholecalciferol (VITAMIN D3) 10 mcg (400 units) TABS tablet     sertraline (ZOLOFT) 50 MG tablet     diclofenac (VOLTAREN) 75 MG EC tablet     No current facility-administered medications for this visit.       Physical Exam: /79   Pulse 118   Temp 98.1  F (36.7  C) (Oral)   Resp 18   Ht 1.759 m (5' 9.25\")   Wt 91.9 kg " "(202 lb 9.6 oz)   SpO2 98%   BMI 29.70 kg/m       Wt Readings from Last 3 Encounters:   09/14/21 91.9 kg (202 lb 9.6 oz)   04/05/21 89.8 kg (198 lb) (97 %, Z= 1.91)*   03/22/21 89.8 kg (198 lb) (97 %, Z= 1.91)*     * Growth percentiles are based on [facility] (Girls, 2-20 Years) data.     Ht Readings from Last 2 Encounters:   09/14/21 1.759 m (5' 9.25\")   04/05/21 1.753 m (5' 9\") (97 %, Z= 1.85)*     * Growth percentiles are based on [facility] (Girls, 2-20 Years) data.     Facility age limit for growth percentiles is 20 years.      General: Enoch Delgado is alert, interactive and appropriate for age throughout exam.    Karnofsky/Lansky score is 100  HEENT: Skull is atrauamatic and normocephalic. PERRLA, sclera are non icteric and not injected, EOM are intact.  Nares are patent without drainage.  Oropharynx is clear without exudate, erythema or lesions.  Tympanic membranes are opaque bilaterally with light reflex and landmarks present.  Lymph:  Neck is supple without lymphadenopathy.  There is no supraclavicular, axillay or inguinal lymphadenopathy palpated.  Cardiovascular:  HR is regular, S1, S2 no murmur.  Capillary refill is < 2 seconds.  There is no edema.  Respiratory: Respirations are easy.  Lungs are clear to auscultation through out.  No crackles or wheezes.  Gastrointestinal:  BS present in all quadrants.  Abdomen is soft and non-tender.  No hepatosplenomegaly or masses are palpated.  Genitourinary: deferred  Skin: No rashes, bruises or other skin lesions are noted.   Neurological:  DTR are present and equal at patellas bilaterally.  Gait is normal.  Sensation intact in hands and feet.  Musculoskeletal:  Good strength and ROM in all extremities.  Strong dorsiflexion at ankles bilaterally without any pain at the Achilles.    Labs:     Results for orders placed or performed during the hospital encounter of 09/14/21   Echo Pediatric (TTE) Complete     Status: None    Narrative    " [id number]  [facility]  [id number]  [id number]^LUCIO^MONTRELL^L                                                               Study ID: [id number]                                                 [facility] [place] [address]                                                [place] [address]                                                Phone: [phone]                                Pediatric Echocardiogram  ______________________________________________________________________________  Name: ALVAREZ CARDOSO  Study Date: 2021 03:21 PM             Patient Location: [facility]  MRN: [id number]                             Age: 21 yrs  : 2000                             BP: 134/76 mmHg  Gender: Female                              HR: 108  Patient Class: Outpatient                   Height: 69 in  Ordering Provider: MONTRELL VALDES              Weight: 198 lb  Referring Provider: MONTRELL VALDES             BSA: 2.1 m2  Performed By: Severson, Jenna M  Report approved by: Emma Almanza MD  Reason For Study: Evaluate LVEF  ______________________________________________________________________________  ##### CONCLUSIONS #####  Normal cardiac anatomy. There is normal appearance and motion of the  tricuspid, mitral, pulmonary and aortic valves. Technically difficult study  due to poor acoustic windows. Normal right and left ventricular size and  function. Calculated single plane left ventricular ejection fraction from the  4 chamber view is 59%. No pericardial effusion.     ______________________________________________________________________________  Technical information:  A complete two dimensional, MMODE, spectral and color Doppler transthoracic  echocardiogram is performed. The study quality is fair. Technically difficult  study due to poor acoustic windows. Images are obtained from  parasternal,  apical, subcostal and suprasternal notch views. Prior echocardiogram available  for comparison. ECG tracing shows sinus tachycardia at 108 bpm.     Segmental Anatomy:  There is normal atrial arrangement, with concordant atrioventricular and  ventriculoarterial connections.     Systemic and pulmonary veins:  The systemic venous return is normal. Color flow demonstrates flow from two  pulmonary veins entering the left atrium.     Atria and atrial septum:  Normal right atrial size. The left atrium is normal in size. There is no  obvious atrial level shunting.     Atrioventricular valves:  The tricuspid valve is normal in appearance and motion. Trivial tricuspid  valve insufficiency. Insufficient jet to estimate right ventricular systolic  pressure. The mitral valve is normal in appearance and motion. Trivial mitral  valve insufficiency.     Ventricles and Ventricular Septum:  Normal right ventricular size. Normal right ventricular systolic function.  Normal left ventricular size. Normal left ventricular systolic function. The  calculated single plane left ventricular ejection fraction from the 4 chamber  view is 59 %. There is no ventricular level shunting.     Outflow tracts:  Normal great artery relationship. There is unobstructed flow through the right  ventricular outflow tract. The pulmonary valve motion is normal. There is  normal flow across the pulmonary valve. There is unobstructed flow through the  left ventricular outflow tract. Tricuspid aortic valve with normal appearance  and motion. There is normal flow across the aortic valve.     Great arteries:  The main pulmonary artery has normal appearance. There is unobstructed flow in  the main pulmonary artery. The pulmonary artery bifurcation is normal. There  is unobstructed flow in both branch pulmonary arteries. Normal ascending  aorta. The aortic arch appears normal. There is unobstructed antegrade flow in  the ascending, transverse arch,  descending thoracic and abdominal aorta.     Arterial Shunts:  The ductal region is not imaged with this study.     Coronaries:  Normal origin of the right and left proximal coronary arteries from the  corresponding sinus of Valsalva by 2D.     Effusions, catheters, cannulas and leads:  No pericardial effusion.     MMode/2D Measurements & Calculations  LA dimension: 3.5 cm                       Ao root diam: 2.1 cm  LA/Ao: 1.7                                 4 Chamber EF: 59.0 %  LVMI(BSA): 53.0 grams/m2                   LVMI(Height): 24.7  RWT(MM): 0.30     Doppler Measurements & Calculations  Ao V2 max: 130.3 cm/sec                 LV V1 max: 113.5 cm/sec  Ao max P.8 mmHg                     LV V1 max P.2 mmHg  PA V2 max: 81.4 cm/sec                  RV V1 max: 80.0 cm/sec  PA max P.7 mmHg                     RV V1 max P.6 mmHg  LPA max loraine: 90.8 cm/sec  LPA max PG: 3.3 mmHg  RPA max loraine: 64.7 cm/sec  RPA max P.7 mmHg     desc Ao max loraine: 133.6 cm/sec              MPA max loraine: 72.1 cm/sec  desc Ao max P.1 mmHg                   MPA max P.1 mmHg     [place] 2D Z-SCORE VALUES  Measurement NameValue Z-ScorePredictedNormal Range  asc Aorta(2D)   2.6 cm-0.36  2.7      2.1 - 3.4     [place] Z-Scores (Measurements & Calculations)  Measurement NameValue      Z-ScorePredictedNormal Range  IVSd(MM)        0.82 cm    -1.4   1.1      0.73 - 1.38  LVIDd(MM)       4.7 cm     -1.7   5.3      4.6 - 6.1  LVIDs(MM)       3.1 cm     -0.97  3.5      2.7 - 4.2  LVPWd(MM)       0.70 cm    -2.1   0.99     0.72 - 1.25  LV mass(C)d(MM) 112.0 grams-3.1   210.4    141.7 - 312.4  FS(MM)          33.8 %     -0.33  34.9     28.6 - 42.6     Report approved by: Michelle Ni 2021 03:57 PM         Results for orders placed or performed in visit on 21   CBC with platelets differential     Status: Abnormal    Narrative    The following orders were created for panel order CBC with platelets  differential.  Procedure                               Abnormality         Status                     ---------                               -----------         ------                     CBC with platelets and d...[[id number]]  Abnormal            Final result                 Please view results for these tests on the individual orders.   CBC with platelets and differential     Status: Abnormal   Result Value Ref Range    WBC Count 12.6 (H) 4.0 - 11.0 10e3/uL    RBC Count 5.09 3.80 - 5.20 10e6/uL    Hemoglobin 14.1 11.7 - 15.7 g/dL    Hematocrit 43.0 35.0 - 47.0 %    MCV 85 78 - 100 fL    MCH 27.7 26.5 - 33.0 pg    MCHC 32.8 31.5 - 36.5 g/dL    RDW 12.7 10.0 - 15.0 %    Platelet Count 353 150 - 450 10e3/uL    % Neutrophils 68 %    % Lymphocytes 23 %    % Monocytes 6 %    % Eosinophils 2 %    % Basophils 1 %    % Immature Granulocytes 0 %    NRBCs per 100 WBC 0 <1 /100    Absolute Neutrophils 8.6 (H) 1.6 - 8.3 10e3/uL    Absolute Lymphocytes 2.9 0.8 - 5.3 10e3/uL    Absolute Monocytes 0.7 0.0 - 1.3 10e3/uL    Absolute Eosinophils 0.3 0.0 - 0.7 10e3/uL    Absolute Basophils 0.1 0.0 - 0.2 10e3/uL    Absolute Immature Granulocytes 0.0 <=0.0 10e3/uL    Absolute NRBCs 0.0 10e3/uL         Radiology:  None today    Assessment:  Enoch Delgado is a 21 year female with a history of B cell ALL s/p chemotherapy.  She has been off therapy ~5 years and here for her routine cancer survivorship evaluation.  She is doing well post therapy with no clinical evidence of disease recurrence.  She had a healthy baby boy last summer.  She is having more issues with her bilateral knees having pain and saw ortho.  She also would like to meet with genetic counseling to discuss testing for her son since both she and her mom had leukemia.   She does have an elevated BMI and should work to increase physical activity. Does have a slightly elevated WBC today but hasn't been sick.  The following are our long term follow up recommendations.    New  late effects:  Chronic bilateral knee pain    Plan:     1.  RISK OF RECURRENCE: Padmini is ~5 years off therapy from ALL therapy.  CBC today is normal.  I recommend that she come back in 6 months for CBC recheck and then we will transition her to yearly visits.    2.  PSYCHOSOCIAL EFFECTS:  Padmini has a history of chemotherapy that can affect cognition and learning.  She had formal neuropsych testing a few years ago.  She reports doing well in school and attending college.  I recommend that she repeat testing as needed.  I discussed that she could talk with the office of disability services about getting academic accommodations.  She also met with social work last visit.  Please see their notation for details.    3.   RISK FOR PERIPHERAL NEUROPATHY:  Padmini has a history of vincristine and risk for neuropathy.  No signs on exam today.    4.  RISK FOR CARDIAC TOXICITY:  Padmini has a history of anthracycline chemotherapy and risk for cardiomyopathy.  She had an echo in 2016 that was normal.  She needs an echo every 5 years.  We will repeat this in 2021.   Done today and WNL.    There is also a risk for metabolic syndrome after leukemia therapy.  In the future, Padmini needs a fasting BG and lipids every 2-3 years.  We did screen with a hemoglobin A1C that was normal.  I did discuss that she should eat a heart healthy diet and increase her physical activity given her BMI and for overall general health.  We will continue to follow this.    5.  BONE HEALTH:  Padmini has a history of steroids and methotrexate which places her at risk for osteopenia.  She had a DEXA in 4/2021 that was WNL.  She did see ortho a few months ago about her knee pain.  They are just observing for now.     6.  FEMALE ISSUES RELATED TO FERTILITY: Padmini had a moderate amount of alkylating agents so is at risk for diminished ovarian reserve and fertility issues.  Has not had any fertility issues and had a healthy baby boy 6/2020.    7.  RISK FOR SECONDARY  NEOPLASMS:  Padmini has a history of chemotherapy that places her at risk for secondary myelodysplasia.  We recommend that she have at least yearly CBCs until 10 years off therapy.  She should wear high SPF sunscreen when outdoors.      8.  GENETICS:  Padmini and her mom both had ALL and genetic testing in the past.  No known genetic mutations were found.  We will continue follow her family history.  She would like testing for her son and I did put in a counseling referral for that discussion.    9. RISK FOR RENAL TOXICITY:  Padmini had chemotherapy which can place her at risk for kidney toxicity. She should have yearly BP and urinary history.  We checked a baseline CMP that was normal.      10.  RISK FOR LIVER TOXICITY:  Padmini has a history of chemotherapy which can affect her liver function.  She had a baseline CMP that was normal.    It was a pleasure meeting with Padmini today.  We appreciate the opportunity to participate in her care.  If you have any questions or concerns, I can be reached at [phone].  We ask that Padmini return to see us in 6 months with a CBC .       Meron Burton MSN, APRN, CPNP-AC, CPON  Department of Pediatrics  Division of Hematology/Oncology    Review of the result(s) of each unique test - CBC, echocardiogram  40 minutes spent on the date of the encounter doing chart review, history and exam, documentation and further activities per the note    Participation in the childhood Cancer Survivor Program database (IRB study # [id number] - Late Effects in Survivors of Cancer, Stem Cell Transplantation, or Blood Disorders) was reviewed with the participant (and/or family) and all risks and benefits were explained. I answered all questions and reviewed all information as listed on our written consent document. Participant (and/or parent) is happy to consent and has no further questions.    {      KAMAR Nieves CNP

## 2021-10-03 ENCOUNTER — HEALTH MAINTENANCE LETTER (OUTPATIENT)
Age: 21
End: 2021-10-03

## 2021-12-29 ENCOUNTER — TELEPHONE (OUTPATIENT)
Dept: ORTHOPEDICS | Facility: CLINIC | Age: 21
End: 2021-12-29
Payer: MEDICAID

## 2021-12-29 NOTE — TELEPHONE ENCOUNTER
[facility] Call Center    Phone Message    May a detailed message be left on voicemail: yes     Reason for Call: Other: wanting a sooner appt If possible, taking alot of ibuprofren daily and pain is not getting any better. set for 01/17/2021 next avail right now.     Action Taken: Message routed to:  [facility] ([facility] Ortho    Travel Screening: Not Applicable

## 2021-12-29 NOTE — TELEPHONE ENCOUNTER
I spoke with the patients mother and we moved her appointment earlier to January 10th at 10am. The patients mother understood and had no further questions.     RADHA Forbes

## 2022-01-10 ENCOUNTER — OFFICE VISIT (OUTPATIENT)
Dept: ORTHOPEDICS | Facility: CLINIC | Age: 22
End: 2022-01-10
Payer: MEDICAID

## 2022-01-10 VITALS — WEIGHT: 202 LBS | HEIGHT: 69 IN | BODY MASS INDEX: 29.92 KG/M2

## 2022-01-10 DIAGNOSIS — M25.561 CHRONIC PAIN OF RIGHT KNEE: Primary | ICD-10-CM

## 2022-01-10 DIAGNOSIS — G89.29 CHRONIC PAIN OF RIGHT KNEE: Primary | ICD-10-CM

## 2022-01-10 PROCEDURE — 99212 OFFICE O/P EST SF 10 MIN: CPT | Performed by: ORTHOPAEDIC SURGERY

## 2022-01-10 ASSESSMENT — MIFFLIN-ST. JEOR: SCORE: 1754.61

## 2022-01-10 NOTE — LETTER
Date:January 11, 2022      Patient was self referred, no letter generated. Do not send.        [facility] Health Information

## 2022-01-10 NOTE — PROGRESS NOTES
"Enoch Delgado is a very pleasant 21-year-old female who is well-known to me.  I initially met her back in 2013/2014 at that time she was undergoing cancer treatment for a history of ALL.  She also had a discoid meniscus.  I performed a left knee discoid meniscus saucerization in 2014 and a revision saucerization/meniscectomy in 2017.  She does have a right knee discoid meniscus as demonstrated on prior MRI; no surgery on this.  She initially did very well my last visit with her was in 2018 at that time I felt that overall her knee was stable it was found to be structurally sound.  She had done well from her cancer perspective.      Unfortunately, Padmini sustained a fall back in August.  She had some right knee pain at that time but it was not debilitating.  However, it worsened a few months ago to the point where she is having difficulty performing activities she likes to do without pain.  She does note some intermittent swelling.  She states that it feels similar to her prior tears in her contralateral knee, but might not be as bad yet.  She does not describe mechanical symptoms but has some difficulty with range of motion and feels like there is sometimes a \"bubble\" in her knee.  Her pain is consistently anterior/lateral.    Physical exam:  Right knee    Mild effusion  No wounds noted, no erythema  Range of motion 0-130, pain at end extension and flexion.  Pain is mostly lateral.  Tender to palpation over lateral joint line, mild tenderness over lateral femoral condyle and lateral tibial plateau  Positive Melissa's - pain is lateral  Negative Lachman/anterior drawer, negative posterior drawer  Strength equal to contralateral side against moderate resistance, no lag  Neurovascularly intact distally    No new imaging obtained today    Clinical assessment: Left knee discoid meniscus and revision saucerization in 2014 and 2017.  Currently doing well without issues.     Right knee discoid meniscus with suspected " tear     History of ALL.  Doing well.    Plan: We discussed the diagnosis, prognosis, and treatment options.  We discussed next steps.  Given that she had an injury and her new symptoms since her prior MRI in April, I would like to get a repeat right knee MRI.  We are concerned that she has a meniscal tear in her lateral discoid meniscus.  We communicated that with the patient and her mother and they understand.  They would like to proceed with the MRI as recommended and all of their questions were answered. Will call on the phone when imaging  complete.

## 2022-01-10 NOTE — NURSING NOTE
"Reason For Visit:   Chief Complaint   Patient presents with     RECHECK     Bilateral knee         ?  No  Occupation Assisted living .  Currently working? Yes.  Work status?  Part-time.  Date of surgery: 12/15/2015  Type of surgery: Left Knee Exam Under Anesthesia, Left Knee Arthroscopy, Saucerization      She went hiking in August and had a fall and she has had increased knee pain since then. She also had another fall on ice recently. The majority of her knee pain is on the right. She has been wearing a brace, using ice and heat and taking tylenol.     SANE Score  Left Knee: 100  Right Knee: 60-70    Pain Assessment  Patient Currently in Pain: Yes  0-10 Pain Scale: 6  Primary Pain Location: Knee    Ht 1.759 m (5' 9.25\")   Wt 91.6 kg (202 lb)   BMI 29.62 kg/m           Allergies   Allergen Reactions     Vancomycin      Itching and redmans syndrome     Asparaginase      4/14/14- Peg-asparaginase anaphylaxis with throat irritation and truncal itching  4/16/14- Erwinia asparaginase anaphylaxis with throat irritation, difficulty swallowing and truncal itching     Naproxen      Lip swells, does NOT occur with ibuprofen     Pentamidine Cough     Saline [Sodium Chloride] Other (See Comments)     DO NOT USE PREFILLED SYIRNGES!!!  PLEASE DRAW UP SALINE FROM VIALS AS THE PREFILLED SYRINGES MAKE HER NAUSEATED.     Mercaptopurine Other (See Comments) and Rash     Eosinophilia, on claritin with mercaptopurine       Current Outpatient Medications   Medication     cholecalciferol (VITAMIN D3) 10 mcg (400 units) TABS tablet     sertraline (ZOLOFT) 50 MG tablet     diclofenac (VOLTAREN) 75 MG EC tablet     No current facility-administered medications for this visit.         Leidy Chacon, ATC    "

## 2022-01-11 ENCOUNTER — ANCILLARY PROCEDURE (OUTPATIENT)
Dept: MRI IMAGING | Facility: CLINIC | Age: 22
End: 2022-01-11
Attending: ORTHOPAEDIC SURGERY
Payer: MEDICAID

## 2022-01-11 DIAGNOSIS — M25.561 CHRONIC PAIN OF RIGHT KNEE: ICD-10-CM

## 2022-01-11 DIAGNOSIS — G89.29 CHRONIC PAIN OF RIGHT KNEE: ICD-10-CM

## 2022-01-11 PROCEDURE — 73721 MRI JNT OF LWR EXTRE W/O DYE: CPT | Mod: RT | Performed by: RADIOLOGY

## 2022-01-21 NOTE — RESULT ENCOUNTER NOTE
I had a chance to review the imaging study on Enoch Delgado  Can you please call the patient and explain the following:  I reviewed the MRI. It shows that her discoid meniscus has a tear within the meniscus. This is different from prior MRI that showed her discoid mensicus without a tear.    I am going to offer her surgery - meniscectomy / saucerization. Possible repair.      Please document that you contact the patient, ok to offer a followup visit to discuss with me if the patient wants.

## 2022-01-24 ENCOUNTER — VIRTUAL VISIT (OUTPATIENT)
Dept: ORTHOPEDICS | Facility: CLINIC | Age: 22
End: 2022-01-24
Payer: MEDICAID

## 2022-01-24 DIAGNOSIS — Q68.6 DISCOID MENISCUS OF KNEE: Primary | ICD-10-CM

## 2022-01-24 PROCEDURE — 99441 PR PHYSICIAN TELEPHONE EVALUATION 5-10 MIN: CPT | Performed by: ORTHOPAEDIC SURGERY

## 2022-01-24 NOTE — LETTER
1/24/2022         RE: Enoch Delgado  [id number] [address] [place] [id number]        Dear Colleague,    Thank you for referring your patient, Enoch Delgado, to the [facility] [place]. Please see a copy of my visit note below.    Padmini is a 21 year old who is being evaluated via a billable telephone visit.      I the opportunity complete a telephone visit today with the patient regarding her right knee.  She is a very pleasant 21-year-old female.  She has a discoid meniscus on the left knee.  I performed a saucerization a number of years ago.  She has been very happy with the results and states that she has no pain.  We did have to go back and do a revision saucerization a few years later.  She is had worsening pain about her right knee.  Approximately 7 or 8 months ago an MRI demonstrated a discoid meniscus of her lateral meniscus on the right knee without tear.  She had a new event and now has profound mechanical symptoms.  New MRI demonstrates a new tear.  We came together for a telephone visit to discuss the results.    No examination was completed today as this was a telephone visit    MRI was reviewed which shows large discoid meniscus with an unstable flap    Clinical assessment symptomatic discoid meniscus    Plan: Long discussion with the patient.  Reviewed the diagnosis potential treatment options.  I offered her arthroscopic meniscectomy/saucerization and possible repair.  I discussed with her the pros cons risks and benefits of surgery.  We reviewed the expected course of recovery alternative treatment options.  We will look for time to schedule and this can be completed.    What phone number would you like to be contacted at? [phone]  How would you like to obtain your AVS? MyChart  Phone call duration: 10 minutes        Again, thank you for allowing me to participate in the care of your patient.        Sincerely,        Vivek Manrique MD

## 2022-01-24 NOTE — PROGRESS NOTES
Padmini is a 21 year old who is being evaluated via a billable telephone visit.      I the opportunity complete a telephone visit today with the patient regarding her right knee.  She is a very pleasant 21-year-old female.  She has a discoid meniscus on the left knee.  I performed a saucerization a number of years ago.  She has been very happy with the results and states that she has no pain.  We did have to go back and do a revision saucerization a few years later.  She is had worsening pain about her right knee.  Approximately 7 or 8 months ago an MRI demonstrated a discoid meniscus of her lateral meniscus on the right knee without tear.  She had a new event and now has profound mechanical symptoms.  New MRI demonstrates a new tear.  We came together for a telephone visit to discuss the results.    No examination was completed today as this was a telephone visit    MRI was reviewed which shows large discoid meniscus with an unstable flap    Clinical assessment symptomatic discoid meniscus    Plan: Long discussion with the patient.  Reviewed the diagnosis potential treatment options.  I offered her arthroscopic meniscectomy/saucerization and possible repair.  I discussed with her the pros cons risks and benefits of surgery.  We reviewed the expected course of recovery alternative treatment options.  We will look for time to schedule and this can be completed.    What phone number would you like to be contacted at? [phone]  How would you like to obtain your AVS? [email or web address]  Phone call duration: 10 minutes

## 2022-01-24 NOTE — LETTER
Date:January 25, 2022      Patient was self referred, no letter generated. Do not send.        [facility] Health Information

## 2022-01-25 ENCOUNTER — TELEPHONE (OUTPATIENT)
Dept: ORTHOPEDICS | Facility: CLINIC | Age: 22
End: 2022-01-25
Payer: MEDICAID

## 2022-01-25 NOTE — TELEPHONE ENCOUNTER
Patient is scheduled for surgery with Dr. Manrique    Spoke with: Patient    Date of Surgery: 3/8/22    Location: [facility]    Post op: 1 week     Pre op with Provider: Complete    H&P: Scheduled with PAC virtual 2/8/22    Pre-procedure COVID-19 Test: To be scheduled    Additional imaging/appointments: N/A    Surgery packet: To be mailed to patient    Additional comments: Unsure if patient's health history warrants [facility] vs [facility]. Will check PAC recommendation and confirm surgery date/location after their assessment.

## 2022-01-26 NOTE — TELEPHONE ENCOUNTER
FUTURE VISIT INFORMATION      SURGERY INFORMATION:    Date: 3/8/22    [facility]: [facility]    Surgeon:  Vivek Manrique MD    Anesthesia Type:  choice    Procedure: Examination under anesthesia, knee arthroscopy, meniscectomy and saucerization, possible repair    Consult: virtual visit 22    RECORDS REQUESTED FROM:       Primary Care Provider: Kirsten Gee CNM- [facility]    Most recent EKG+ Tracin/20/15- [facility]    Most recent ECHO: 21

## 2022-01-27 DIAGNOSIS — Q68.6 DISCOID MENISCUS OF KNEE: Primary | ICD-10-CM

## 2022-02-08 ENCOUNTER — PRE VISIT (OUTPATIENT)
Dept: SURGERY | Facility: CLINIC | Age: 22
End: 2022-02-08

## 2022-02-08 ENCOUNTER — TELEPHONE (OUTPATIENT)
Dept: ORTHOPEDICS | Facility: CLINIC | Age: 22
End: 2022-02-08

## 2022-02-08 ENCOUNTER — VIRTUAL VISIT (OUTPATIENT)
Dept: SURGERY | Facility: CLINIC | Age: 22
End: 2022-02-08
Payer: MEDICAID

## 2022-02-08 ENCOUNTER — ANESTHESIA EVENT (OUTPATIENT)
Dept: SURGERY | Facility: CLINIC | Age: 22
End: 2022-02-08

## 2022-02-08 DIAGNOSIS — Q68.6 DISCOID MENISCUS OF RIGHT KNEE: ICD-10-CM

## 2022-02-08 DIAGNOSIS — Z01.818 PREOP EXAMINATION: Primary | ICD-10-CM

## 2022-02-08 PROCEDURE — 99203 OFFICE O/P NEW LOW 30 MIN: CPT | Mod: 95 | Performed by: PHYSICIAN ASSISTANT

## 2022-02-08 RX ORDER — NORELGESTROMIN AND ETHINYL ESTRADIOL 35; 150 UG/MG; UG/MG
1 PATCH TRANSDERMAL
COMMUNITY
Start: 2021-06-10

## 2022-02-08 RX ORDER — ACETAMINOPHEN 325 MG/1
325-650 TABLET ORAL EVERY 6 HOURS PRN
COMMUNITY

## 2022-02-08 ASSESSMENT — COPD QUESTIONNAIRES: COPD: 0

## 2022-02-08 ASSESSMENT — PAIN SCALES - GENERAL: PAINLEVEL: SEVERE PAIN (6)

## 2022-02-08 NOTE — PROGRESS NOTES
Padmini is a 21 year old who is being evaluated via a billable video visit.      How would you like to obtain your AVS? Mail a copy    HPI         Review of Systems         Objective    Vitals - Patient Reported  Pain Score: Severe Pain (6)        Physical Exam   CHANDRIKA Posadas LPN

## 2022-02-08 NOTE — H&P
Pre-Operative H & P     CC:  Preoperative exam to assess for increased cardiopulmonary risk while undergoing surgery and anesthesia.    Date of Encounter: 2/8/2022  Primary Care Physician:  Jasiel Dutton     Reason for visit:   Encounter Diagnoses   Name Primary?     Preop examination Yes     Discoid meniscus of right knee        HPI  Enoch Delgado is a 21 year old female who presents for pre-operative H & P in preparation for  Procedure Information     Date/Time: 3/8/2022     Procedure: Examination under anesthesia, knee arthroscopy, meniscectomy and saucerization, possible repair, right    Anesthesia type: Choice    Pre-op diagnosis: discoid meniscus of right knee    Location: [facility]    Providers: Dr. Manrique        This is a 21-year-old female with past medical history significant for AL L status post chemotherapy.  She was diagnosed at age 13 years old and is now in remission.  She is followed by pediatric hematology oncology every 6 months and is doing well.  She gave birth to a healthy baby boy on 6/22/2020 and is currently breast-feeding.  She underwent surgery for her discoid meniscus of the left knee in 2017.  She now has worsening pain of her right knee with demonstrated discoid meniscus of the lateral meniscus on the right knee without a tear.  More recent MRI demonstrates a new tear.  She is ready to proceed with the above procedure.    History is obtained from the patient and chart review    Hx of abnormal bleeding or anti-platelet use: denies    Menstrual history: Patient's last menstrual period was 01/01/2022 (approximate).:      Past Medical History  Past Medical History:   Diagnosis Date     ALL (acute lymphoblastic leukaemia)     very high risk (negative CNS; loss of 7q and ETV6, iAMP21 +) treated per COG protocol [id number]     [place] toe      PONV (postoperative nausea and vomiting)        Past Surgical History  Past Surgical History:    Procedure Laterality Date     ARTHROSCOPY KNEE WITH LATERAL MENISCECTOMY Left 2/3/2017    Procedure: ARTHROSCOPY KNEE WITH LATERAL MENISCECTOMY;  Surgeon: Vivek Manrique MD;  Location: [facility] OR     ARTHROSCOPY KNEE WITH MENISCAL REPAIR Left 12/15/2015    Procedure: ARTHROSCOPY KNEE WITH MENISCAL REPAIR;  Surgeon: Vivek Manrique MD;  Location: [facility] OR     BONE MARROW ASPIRATE &BIOPSY  2/21/2014          LUMBAR PUNCTURE  2/21/2014          REMOVE PORT VASCULAR ACCESS N/A 5/17/2016    Procedure: REMOVE PORT VASCULAR ACCESS;  Surgeon: Heaven Wilson MD;  Location: [facility]      SPINAL PUNCTURE,LUMBAR, INTRATHECAL CHEMO DELIVERY N/A 8/11/2015    Procedure: SPINAL PUNCTURE,LUMBAR, INTRATHECAL CHEMO DELIVERY;  Surgeon: Jinny Charles APRN CNP;  Location: [facility]      SPINAL PUNCTURE,LUMBAR, INTRATHECAL CHEMO DELIVERY N/A 11/2/2015    Procedure: SPINAL PUNCTURE,LUMBAR, INTRATHECAL CHEMO DELIVERY;  Surgeon: Gail Manzano MD;  Location: [facility]      SPINAL PUNCTURE,LUMBAR, INTRATHECAL CHEMO DELIVERY N/A 1/26/2016    Procedure: SPINAL PUNCTURE,LUMBAR, INTRATHECAL CHEMO DELIVERY;  Surgeon: Jinny Charles APRN CNP;  Location: [facility]      SPINAL PUNCTURE,LUMBAR, INTRATHECAL CHEMO DELIVERY N/A 4/18/2016    Procedure: SPINAL PUNCTURE,LUMBAR, INTRATHECAL CHEMO DELIVERY;  Surgeon: Jonny Hodgson MD;  Location: [facility]        Prior to Admission Medications  Current Outpatient Medications   Medication Sig Dispense Refill     acetaminophen (TYLENOL) 325 MG tablet Take 325-650 mg by mouth every 6 hours as needed for mild pain       cholecalciferol (VITAMIN D3) 10 mcg (400 units) TABS tablet Take 10 mcg by mouth every evening        norelgestromin-ethinyl estradiol (ORTHO EVRA) 150-35 MCG/24HR patch Place 1 patch onto the skin       sertraline (ZOLOFT) 50 MG tablet Take 150 mg by mouth every evening          Allergies  Allergies    Allergen Reactions     Vancomycin      Itching and redmans syndrome     Asparaginase      4/14/14- Peg-asparaginase anaphylaxis with throat irritation and truncal itching  4/16/14- Erwinia asparaginase anaphylaxis with throat irritation, difficulty swallowing and truncal itching     Naproxen      Lip swells, does NOT occur with ibuprofen     Pentamidine Cough     Saline [Sodium Chloride] Other (See Comments)     DO NOT USE PREFILLED SYIRNGES!!!  PLEASE DRAW UP SALINE FROM VIALS AS THE PREFILLED SYRINGES MAKE HER NAUSEATED.     Mercaptopurine Other (See Comments) and Rash     Eosinophilia, on claritin with mercaptopurine       Social History  Social History     Socioeconomic History     Marital status: Single     Spouse name: Not on file     Number of children: Not on file     Years of education: Not on file     Highest education level: Not on file   Occupational History     Not on file   Tobacco Use     Smoking status: Passive Smoke Exposure - Never Smoker     Smokeless tobacco: Never Used     Tobacco comment: Mom smokes outside   Substance and Sexual Activity     Alcohol use: No     Drug use: No     Sexual activity: Never   Other Topics Concern     Parent/sibling w/ CABG, MI or angioplasty before 65F 55M? Not Asked   Social History Narrative    Padmini lives with her mom, Jian (mom's significant other) and two younger siblings. Her adoptive father is the biological father of her siblings. He is out of the state working, but is involved and sees her on weekends. Grandma is a great source of support. Her mother is with her this evening.      Social Determinants of Health     Financial Resource Strain: Not on file   Food Insecurity: Not on file   Transportation Needs: Not on file   Physical Activity: Not on file   Stress: Not on file   Social Connections: Not on file   Intimate Partner Violence: Not on file   Housing Stability: Not on file       Family History  Family History   Problem Relation Age of Onset     Cancer  Mother 3        Acute lymphoblastic leukemia     Cancer Other         Hodgkin Lymphoma; Great Aunt     Diabetes Maternal Grandmother      Coronary Artery Disease Maternal Grandfather      Myocardial Infarction Maternal Grandfather        Review of Systems  The complete review of systems is negative other than noted in the HPI or here.     Anesthesia Evaluation  Pt has had prior anesthetic.     History of anesthetic complications  - PONV.        ROS/MED HX  ENT/Pulmonary:  - neg pulmonary ROS  (-) asthma and COPD   Neurologic:  - neg neurologic ROS     Cardiovascular:     (+) -----Previous cardiac testing   Echo: Date: 9/14/21 Results:  Normal cardiac anatomy. There is normal appearance and motion of the  tricuspid, mitral, pulmonary and aortic valves. Technically difficult study  due to poor acoustic windows. Normal right and left ventricular size and  function. Calculated single plane left ventricular ejection fraction from the  4 chamber view is 59%. No pericardial effusion.     ______________________________________________________________________________  Technical information:  A complete two dimensional, MMODE, spectral and color Doppler transthoracic  echocardiogram is performed. The study quality is fair. Technically difficult  study due to poor acoustic windows. Images are obtained from parasternal,  apical, subcostal and suprasternal notch views. Prior echocardiogram available  for comparison. ECG tracing shows sinus tachycardia at 108 bpm.     Segmental Anatomy:  There is normal atrial arrangement, with concordant atrioventricular and  ventriculoarterial connections.     Systemic and pulmonary veins:  The systemic venous return is normal. Color flow demonstrates flow from two  pulmonary veins entering the left atrium.     Atria and atrial septum:  Normal right atrial size. The left atrium is normal in size. There is no  obvious atrial level shunting.     Atrioventricular valves:  The tricuspid valve is  normal in appearance and motion. Trivial tricuspid  valve insufficiency. Insufficient jet to estimate right ventricular systolic  pressure. The mitral valve is normal in appearance and motion. Trivial mitral  valve insufficiency.     Ventricles and Ventricular Septum:  Normal right ventricular size. Normal right ventricular systolic function.  Normal left ventricular size. Normal left ventricular systolic function. The  calculated single plane left ventricular ejection fraction from the 4 chamber  view is 59 %. There is no ventricular level shunting.     Outflow tracts:  Normal great artery relationship. There is unobstructed flow through the right  ventricular outflow tract. The pulmonary valve motion is normal. There is  normal flow across the pulmonary valve. There is unobstructed flow through the  left ventricular outflow tract. Tricuspid aortic valve with normal appearance  and motion. There is normal flow across the aortic valve.     Great arteries:  The main pulmonary artery has normal appearance. There is unobstructed flow in  the main pulmonary artery. The pulmonary artery bifurcation is normal. There  is unobstructed flow in both branch pulmonary arteries. Normal ascending  aorta. The aortic arch appears normal. There is unobstructed antegrade flow in  the ascending, transverse arch, descending thoracic and abdominal aorta.     Arterial Shunts:  The ductal region is not imaged with this study.     Coronaries:  Normal origin of the right and left proximal coronary arteries from the  corresponding sinus of Valsalva by 2D.  Stress Test: Date: Results:     ECG Reviewed: Date: Results:     Cath: Date: Results:     METS/Exercise Tolerance: >4 METS    Hematologic:     (+) history of blood transfusion, no previous transfusion reaction,  (-) history of blood clots   Musculoskeletal:       GI/Hepatic:  - neg GI/hepatic ROS     Renal/Genitourinary:  - neg Renal ROS     Endo:  - neg endo ROS     Psychiatric/Substance  Use:     (+) psychiatric history depression     Infectious Disease:  - neg infectious disease ROS     Malignancy:   (+) Malignancy, History of Lymphoma/Leukemia.Lymph CA Remission status post Chemo.        Other: Comment: Pt is breastfeeding     (+) LMP: 1/1/22, ,             Virtual visit -  No vitals were obtained    Physical Exam  Constitutional: Awake, alert, cooperative, no apparent distress, and appears stated age.  HENT: Normocephalic  Respiratory: non labored breathing   Neurologic: Awake, alert, oriented to name, place and time.   Neuropsychiatric: Calm, cooperative. Normal affect.      Prior Labs/Diagnostic Studies   All labs and imaging personally reviewed     Component      Latest Ref Rng & Units 3/22/2021   Sodium      133 - 144 mmol/L 138   Potassium      3.4 - 5.3 mmol/L 3.9   Chloride      96 - 110 mmol/L 108   Carbon Dioxide      20 - 32 mmol/L 24   Anion Gap      3 - 14 mmol/L 6   Glucose      70 - 99 mg/dL 113 (H)   Urea Nitrogen      7 - 30 mg/dL 10   Creatinine      0.50 - 1.00 mg/dL 0.55   GFR Estimate      >60 mL/min/1.73:m2 >90   GFR Estimate If Black      >60 mL/min/1.73:m2 >90   Calcium      8.5 - 10.1 mg/dL 9.2   Bilirubin Total      0.2 - 1.3 mg/dL 0.5   Albumin      3.4 - 5.0 g/dL 3.8   Protein Total      6.8 - 8.8 g/dL 7.6   Alkaline Phosphatase      40 - 150 U/L 104   ALT      0 - 50 U/L 20   AST      0 - 35 U/L 14     Component      Latest Ref Rng & Units 9/14/2021   WBC      4.0 - 11.0 10e3/uL 12.6 (H)   RBC Count      3.80 - 5.20 10e6/uL 5.09   Hemoglobin      11.7 - 15.7 g/dL 14.1   Hematocrit      35.0 - 47.0 % 43.0   MCV      78 - 100 fL 85   MCH      26.5 - 33.0 pg 27.7   MCHC      31.5 - 36.5 g/dL 32.8   RDW      10.0 - 15.0 % 12.7   Platelet Count      150 - 450 10e3/uL 353   % Neutrophils      % 68   % Lymphocytes      % 23   % Monocytes      % 6   % Eosinophils      % 2   % Basophils      % 1   % Immature Granulocytes      % 0   NRBCs per 100 WBC      <1 /100 0   Absolute  Neutrophils      1.6 - 8.3 10e3/uL 8.6 (H)   Absolute Lymphocytes      0.8 - 5.3 10e3/uL 2.9   Absolute Monocytes      0.0 - 1.3 10e3/uL 0.7   Absolute Eosinophils      0.0 - 0.7 10e3/uL 0.3   Absolute Basophils      0.0 - 0.2 10e3/uL 0.1   Absolute Immature Granulocytes      <=0.0 10e3/uL 0.0   Absolute NRBCs      10e3/uL 0.0       MR right knee without contrast 1/11/2022   Impression:     1. Discoid lateral meniscus. New large fluid-filled intrameniscal  cleft extending to the femoral articular surface on a single image,  equivocal for tear.  2. No high-grade hyaline cartilage disease.  3. Tendinosis of the popliteus. Medial and lateral supporting  structures are otherwise intact.        EKG/echo/stress test - if available please see in ROS above       The patient's records and results personally reviewed by this provider.         Outside records reviewed from: Care Everywhere      Assessment      Enoch Delgado is a 21 year old female was seen as a PAC referral for risk assessment and optimization for anesthesia.    Plan/Recommendations  Pt will be optimized for the proposed procedure.  See below for details on the assessment, risk, and preoperative recommendations    NEUROLOGY  - No history of TIA, CVA or seizure  -Post Op delirium risk factors:  No risk identified    ENT  - WALLACE airway, virtual visit    CARDIAC  - no h/o cardiac disease   - echocardiogram 9/2021 completed due to h/o anthracycline chemotherapy for ALL.  Echo is WNL  - METS (Metabolic Equivalents)  Patient performs 4 or more METS exercise without symptoms  Total Score: 0      RCRI-Very low risk: Class 1 0.4% complication rate  Total Score: 0        PULMONARY  -non smoker  TESSA Low Risk  Total Score: 0      - Denies asthma or inhaler use  - Tobacco History      History   Smoking Status     Passive Smoke Exposure - Never Smoker   Smokeless Tobacco     Never Used     Comment: Mom smokes outside       GI  PONV High Risk  Total Score: 3           1  AN PONV: Pt is Female    1 AN PONV: Patient is not a current smoker    1 AN PONV: Patient has history of PONV          ENDOCRINE  - BMI: There is no height or weight on file to calculate BMI.  Overweight (BMI 25.0-29.9)  - No history of Diabetes Mellitus    HEME  VTE Low Risk 0.26%  Total Score: 0      - No history of abnormal bleeding or antiplatelet use.      MSK  - s/p Left Knee Exam Under Anesthesia, Left Knee Arthroscopy, Partial Meniscectomy, Cyst Decompression (Left Knee) 2017 with Dr. 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  She was on therapy until 5/27/2016.  - she follows with pediatric heme/onc every 6 months. Last visit 9/2021. Stable and doing well.    PSYCH  - continue Zoloft at     OTHER   - pt is breastfeeding currently.  We discussed low risk of anesthetic agents in breast milk.        The patient is optimized for their procedure. AVS with information on surgery time/arrival time, meds and NPO status given by nursing staff. No further diagnostic testing indicated.      Video-Visit Details    Type of service:  Video Visit    Patient verbally consented to video service today: YES    Video Call Length: 9 minutes    Originating Location (pt. Location): Home    Distant Location (provider location):  home    Mode of Communication:  Video Conference via VHX    On the day of service:     Prep time: 10 minutes  Visit time: 9 minutes  Documentation time: 12 minutes  ------------------------------------------  Total time: 32 minutes      Janette Portillo PA-C  Preoperative Assessment Center  [facility]  Phone: [phone]  Fax: [phone]

## 2022-02-08 NOTE — H&P (VIEW-ONLY)
Pre-Operative H & P     CC:  Preoperative exam to assess for increased cardiopulmonary risk while undergoing surgery and anesthesia.    Date of Encounter: 2/8/2022  Primary Care Physician:  Jasiel Dutton     Reason for visit:   Encounter Diagnoses   Name Primary?     Preop examination Yes     Discoid meniscus of right knee        HPI  Enoch Delgado is a 21 year old female who presents for pre-operative H & P in preparation for  Procedure Information     Date/Time: 3/8/2022     Procedure: Examination under anesthesia, knee arthroscopy, meniscectomy and saucerization, possible repair, right    Anesthesia type: Choice    Pre-op diagnosis: discoid meniscus of right knee    Location: [facility]    Providers: Dr. Manrique        This is a 21-year-old female with past medical history significant for AL L status post chemotherapy.  She was diagnosed at age 13 years old and is now in remission.  She is followed by pediatric hematology oncology every 6 months and is doing well.  She gave birth to a healthy baby boy on 6/22/2020 and is currently breast-feeding.  She underwent surgery for her discoid meniscus of the left knee in 2017.  She now has worsening pain of her right knee with demonstrated discoid meniscus of the lateral meniscus on the right knee without a tear.  More recent MRI demonstrates a new tear.  She is ready to proceed with the above procedure.    History is obtained from the patient and chart review    Hx of abnormal bleeding or anti-platelet use: denies    Menstrual history: Patient's last menstrual period was 01/01/2022 (approximate).:      Past Medical History  Past Medical History:   Diagnosis Date     ALL (acute lymphoblastic leukaemia)     very high risk (negative CNS; loss of 7q and ETV6, iAMP21 +) treated per COG protocol [id number]     [place] toe      PONV (postoperative nausea and vomiting)        Past Surgical History  Past Surgical History:    Procedure Laterality Date     ARTHROSCOPY KNEE WITH LATERAL MENISCECTOMY Left 2/3/2017    Procedure: ARTHROSCOPY KNEE WITH LATERAL MENISCECTOMY;  Surgeon: Vivek Manrique MD;  Location: [facility]     ARTHROSCOPY KNEE WITH MENISCAL REPAIR Left 12/15/2015    Procedure: ARTHROSCOPY KNEE WITH MENISCAL REPAIR;  Surgeon: Vivek Manrique MD;  Location: [facility] OR     BONE MARROW ASPIRATE &BIOPSY  2/21/2014          LUMBAR PUNCTURE  2/21/2014          REMOVE PORT VASCULAR ACCESS N/A 5/17/2016    Procedure: REMOVE PORT VASCULAR ACCESS;  Surgeon: Heaven Wilson MD;  Location: [facility]      SPINAL PUNCTURE,LUMBAR, INTRATHECAL CHEMO DELIVERY N/A 8/11/2015    Procedure: SPINAL PUNCTURE,LUMBAR, INTRATHECAL CHEMO DELIVERY;  Surgeon: Jinny Charles APRN CNP;  Location: [facility]      SPINAL PUNCTURE,LUMBAR, INTRATHECAL CHEMO DELIVERY N/A 11/2/2015    Procedure: SPINAL PUNCTURE,LUMBAR, INTRATHECAL CHEMO DELIVERY;  Surgeon: Gail Manzano MD;  [facility]: [facility]      SPINAL PUNCTURE,LUMBAR, INTRATHECAL CHEMO DELIVERY N/A 1/26/2016    Procedure: SPINAL PUNCTURE,LUMBAR, INTRATHECAL CHEMO DELIVERY;  Surgeon: Jinny Charles APRN CNP;  [facility]: [facility]      SPINAL PUNCTURE,LUMBAR, INTRATHECAL CHEMO DELIVERY N/A 4/18/2016    Procedure: SPINAL PUNCTURE,LUMBAR, INTRATHECAL CHEMO DELIVERY;  Surgeon: Jonny Hodgson MD;  Location: [facility]        Prior to Admission Medications  Current Outpatient Medications   Medication Sig Dispense Refill     acetaminophen (TYLENOL) 325 MG tablet Take 325-650 mg by mouth every 6 hours as needed for mild pain       cholecalciferol (VITAMIN D3) 10 mcg (400 units) TABS tablet Take 10 mcg by mouth every evening        norelgestromin-ethinyl estradiol (ORTHO EVRA) 150-35 MCG/24HR patch Place 1 patch onto the skin       sertraline (ZOLOFT) 50 MG tablet Take 150 mg by mouth every evening          Allergies  Allergies    Allergen Reactions     Vancomycin      Itching and redmans syndrome     Asparaginase      4/14/14- Peg-asparaginase anaphylaxis with throat irritation and truncal itching  4/16/14- Erwinia asparaginase anaphylaxis with throat irritation, difficulty swallowing and truncal itching     Naproxen      Lip swells, does NOT occur with ibuprofen     Pentamidine Cough     Saline [Sodium Chloride] Other (See Comments)     DO NOT USE PREFILLED SYIRNGES!!!  PLEASE DRAW UP SALINE FROM VIALS AS THE PREFILLED SYRINGES MAKE HER NAUSEATED.     Mercaptopurine Other (See Comments) and Rash     Eosinophilia, on claritin with mercaptopurine       Social History  Social History     Socioeconomic History     Marital status: Single     Spouse name: Not on file     Number of children: Not on file     Years of education: Not on file     Highest education level: Not on file   Occupational History     Not on file   Tobacco Use     Smoking status: Passive Smoke Exposure - Never Smoker     Smokeless tobacco: Never Used     Tobacco comment: Mom smokes outside   Substance and Sexual Activity     Alcohol use: No     Drug use: No     Sexual activity: Never   Other Topics Concern     Parent/sibling w/ CABG, MI or angioplasty before 65F 55M? Not Asked   Social History Narrative    Padmini lives with her mom, Jian (mom's significant other) and two younger siblings. Her adoptive father is the biological father of her siblings. He is out of the state working, but is involved and sees her on weekends. Grandma is a great source of support. Her mother is with her this evening.      Social Determinants of Health     Financial Resource Strain: Not on file   Food Insecurity: Not on file   Transportation Needs: Not on file   Physical Activity: Not on file   Stress: Not on file   Social Connections: Not on file   Intimate Partner Violence: Not on file   Housing Stability: Not on file       Family History  Family History   Problem Relation Age of Onset     Cancer  Mother 3        Acute lymphoblastic leukemia     Cancer Other         Hodgkin Lymphoma; Great Aunt     Diabetes Maternal Grandmother      Coronary Artery Disease Maternal Grandfather      Myocardial Infarction Maternal Grandfather        Review of Systems  The complete review of systems is negative other than noted in the HPI or here.     Anesthesia Evaluation  Pt has had prior anesthetic.     History of anesthetic complications  - PONV.        ROS/MED HX  ENT/Pulmonary:  - neg pulmonary ROS  (-) asthma and COPD   Neurologic:  - neg neurologic ROS     Cardiovascular:     (+) -----Previous cardiac testing   Echo: Date: 9/14/21 Results:  Normal cardiac anatomy. There is normal appearance and motion of the  tricuspid, mitral, pulmonary and aortic valves. Technically difficult study  due to poor acoustic windows. Normal right and left ventricular size and  function. Calculated single plane left ventricular ejection fraction from the  4 chamber view is 59%. No pericardial effusion.     ______________________________________________________________________________  Technical information:  A complete two dimensional, MMODE, spectral and color Doppler transthoracic  echocardiogram is performed. The study quality is fair. Technically difficult  study due to poor acoustic windows. Images are obtained from parasternal,  apical, subcostal and suprasternal notch views. Prior echocardiogram available  for comparison. ECG tracing shows sinus tachycardia at 108 bpm.     Segmental Anatomy:  There is normal atrial arrangement, with concordant atrioventricular and  ventriculoarterial connections.     Systemic and pulmonary veins:  The systemic venous return is normal. Color flow demonstrates flow from two  pulmonary veins entering the left atrium.     Atria and atrial septum:  Normal right atrial size. The left atrium is normal in size. There is no  obvious atrial level shunting.     Atrioventricular valves:  The tricuspid valve is  normal in appearance and motion. Trivial tricuspid  valve insufficiency. Insufficient jet to estimate right ventricular systolic  pressure. The mitral valve is normal in appearance and motion. Trivial mitral  valve insufficiency.     Ventricles and Ventricular Septum:  Normal right ventricular size. Normal right ventricular systolic function.  Normal left ventricular size. Normal left ventricular systolic function. The  calculated single plane left ventricular ejection fraction from the 4 chamber  view is 59 %. There is no ventricular level shunting.     Outflow tracts:  Normal great artery relationship. There is unobstructed flow through the right  ventricular outflow tract. The pulmonary valve motion is normal. There is  normal flow across the pulmonary valve. There is unobstructed flow through the  left ventricular outflow tract. Tricuspid aortic valve with normal appearance  and motion. There is normal flow across the aortic valve.     Great arteries:  The main pulmonary artery has normal appearance. There is unobstructed flow in  the main pulmonary artery. The pulmonary artery bifurcation is normal. There  is unobstructed flow in both branch pulmonary arteries. Normal ascending  aorta. The aortic arch appears normal. There is unobstructed antegrade flow in  the ascending, transverse arch, descending thoracic and abdominal aorta.     Arterial Shunts:  The ductal region is not imaged with this study.     Coronaries:  Normal origin of the right and left proximal coronary arteries from the  corresponding sinus of Valsalva by 2D.  Stress Test: Date: Results:     ECG Reviewed: Date: Results:     Cath: Date: Results:     METS/Exercise Tolerance: >4 METS    Hematologic:     (+) history of blood transfusion, no previous transfusion reaction,  (-) history of blood clots   Musculoskeletal:       GI/Hepatic:  - neg GI/hepatic ROS     Renal/Genitourinary:  - neg Renal ROS     Endo:  - neg endo ROS     Psychiatric/Substance  Use:     (+) psychiatric history depression     Infectious Disease:  - neg infectious disease ROS     Malignancy:   (+) Malignancy, History of Lymphoma/Leukemia.Lymph CA Remission status post Chemo.        Other: Comment: Pt is breastfeeding     (+) LMP: 1/1/22, ,             Virtual visit -  No vitals were obtained    Physical Exam  Constitutional: Awake, alert, cooperative, no apparent distress, and appears stated age.  HENT: Normocephalic  Respiratory: non labored breathing   Neurologic: Awake, alert, oriented to name, place and time.   Neuropsychiatric: Calm, cooperative. Normal affect.      Prior Labs/Diagnostic Studies   All labs and imaging personally reviewed     Component      Latest Ref Rng & Units 3/22/2021   Sodium      133 - 144 mmol/L 138   Potassium      3.4 - 5.3 mmol/L 3.9   Chloride      96 - 110 mmol/L 108   Carbon Dioxide      20 - 32 mmol/L 24   Anion Gap      3 - 14 mmol/L 6   Glucose      70 - 99 mg/dL 113 (H)   Urea Nitrogen      7 - 30 mg/dL 10   Creatinine      0.50 - 1.00 mg/dL 0.55   GFR Estimate      >60 mL/min/1.73:m2 >90   GFR Estimate If Black      >60 mL/min/1.73:m2 >90   Calcium      8.5 - 10.1 mg/dL 9.2   Bilirubin Total      0.2 - 1.3 mg/dL 0.5   Albumin      3.4 - 5.0 g/dL 3.8   Protein Total      6.8 - 8.8 g/dL 7.6   Alkaline Phosphatase      40 - 150 U/L 104   ALT      0 - 50 U/L 20   AST      0 - 35 U/L 14     Component      Latest Ref Rng & Units 9/14/2021   WBC      4.0 - 11.0 10e3/uL 12.6 (H)   RBC Count      3.80 - 5.20 10e6/uL 5.09   Hemoglobin      11.7 - 15.7 g/dL 14.1   Hematocrit      35.0 - 47.0 % 43.0   MCV      78 - 100 fL 85   MCH      26.5 - 33.0 pg 27.7   MCHC      31.5 - 36.5 g/dL 32.8   RDW      10.0 - 15.0 % 12.7   Platelet Count      150 - 450 10e3/uL 353   % Neutrophils      % 68   % Lymphocytes      % 23   % Monocytes      % 6   % Eosinophils      % 2   % Basophils      % 1   % Immature Granulocytes      % 0   NRBCs per 100 WBC      <1 /100 0   Absolute  Neutrophils      1.6 - 8.3 10e3/uL 8.6 (H)   Absolute Lymphocytes      0.8 - 5.3 10e3/uL 2.9   Absolute Monocytes      0.0 - 1.3 10e3/uL 0.7   Absolute Eosinophils      0.0 - 0.7 10e3/uL 0.3   Absolute Basophils      0.0 - 0.2 10e3/uL 0.1   Absolute Immature Granulocytes      <=0.0 10e3/uL 0.0   Absolute NRBCs      10e3/uL 0.0       MR right knee without contrast 1/11/2022   Impression:     1. Discoid lateral meniscus. New large fluid-filled intrameniscal  cleft extending to the femoral articular surface on a single image,  equivocal for tear.  2. No high-grade hyaline cartilage disease.  3. Tendinosis of the popliteus. Medial and lateral supporting  structures are otherwise intact.        EKG/echo/stress test - if available please see in ROS above       The patient's records and results personally reviewed by this provider.         Outside records reviewed from: Care Everywhere      Assessment      Enoch Delgado is a 21 year old female was seen as a PAC referral for risk assessment and optimization for anesthesia.    Plan/Recommendations  Pt will be optimized for the proposed procedure.  See below for details on the assessment, risk, and preoperative recommendations    NEUROLOGY  - No history of TIA, CVA or seizure  -Post Op delirium risk factors:  No risk identified    ENT  - WALLACE airway, virtual visit    CARDIAC  - no h/o cardiac disease   - echocardiogram 9/2021 completed due to h/o anthracycline chemotherapy for ALL.  Echo is WNL  - METS (Metabolic Equivalents)  Patient performs 4 or more METS exercise without symptoms  Total Score: 0      RCRI-Very low risk: Class 1 0.4% complication rate  Total Score: 0        PULMONARY  -non smoker  TESSA Low Risk  Total Score: 0      - Denies asthma or inhaler use  - Tobacco History      History   Smoking Status     Passive Smoke Exposure - Never Smoker   Smokeless Tobacco     Never Used     Comment: Mom smokes outside       GI  PONV High Risk  Total Score: 3           1  AN PONV: Pt is Female    1 AN PONV: Patient is not a current smoker    1 AN PONV: Patient has history of PONV          ENDOCRINE  - BMI: There is no height or weight on file to calculate BMI.  Overweight (BMI 25.0-29.9)  - No history of Diabetes Mellitus    HEME  VTE Low Risk 0.26%  Total Score: 0      - No history of abnormal bleeding or antiplatelet use.      MSK  - s/p Left Knee Exam Under Anesthesia, Left Knee Arthroscopy, Partial Meniscectomy, Cyst Decompression (Left Knee) 2017 with Dr. 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  She was on therapy until 5/27/2016.  - she follows with pediatric heme/onc every 6 months. Last visit 9/2021. Stable and doing well.    PSYCH  - continue Zoloft at     OTHER   - pt is breastfeeding currently.  We discussed low risk of anesthetic agents in breast milk.        The patient is optimized for their procedure. AVS with information on surgery time/arrival time, meds and NPO status given by nursing staff. No further diagnostic testing indicated.      Video-Visit Details    Type of service:  Video Visit    Patient verbally consented to video service today: YES    Video Call Length: 9 minutes    Originating Location (pt. Location): Home    Distant Location (provider location):  home    Mode of Communication:  Video Conference via [employer]    On the day of service:     Prep time: 10 minutes  Visit time: 9 minutes  Documentation time: 12 minutes  ------------------------------------------  Total time: 32 minutes      Janette Portillo PA-C  Preoperative Assessment Center  [facility]  Clinic and Surgery Center  Phone: [phone]  Fax: [phone]

## 2022-02-08 NOTE — PATIENT INSTRUCTIONS
Preparing for Your Surgery      Name:  Enoch Delgado   MRN:  [id number]   :  2000   Today's Date:  2022       Arriving for surgery:  Surgery date:  3/8/22  Arrival time:  07:45 am    Restrictions due to COVID 19       Effective 22 [facility] is implementing the following visitor policy:     1 person may accompany the patient through the Pre-Op process.      Then that person will be asked to leave the building.        There will be no visitors in the Surgery Waiting Room.        Inpatients are allowed 1 consistent visitor for the duration of their stay.      Visitors must wear a mask.      Visitors must not be ill.      Visiting hours are 8 am to 8 pm.    [employer] parking is available for anyone with mobility limitations or disabilities.  ([place]  24 hours/ 7 days a week; [place]  7 am- 3:30 pm, Mon- Fri)    Please come to:   [facility] [address]  [address] [place], [place] [address]. [place]  [address]  -[employer] parking is available in front of [facility] from 5:15AM to 8:00PM. If you prefer, park your car in the Green Lot.  -Proceed to the 3rd floor, check in at the Adult Surgery Waiting Lounge. [phone]    If an escort is needed stop at the Information Desk in the lobby. Inform the information person that you are here for surgery. An escort to the Adult Surgery Waiting Lounge will be provided.        What can I eat or drink?  -  You may eat and drink normally for up to 8 hours before your surgery.   -  You may have clear liquids until 2 hours before surgery.     Examples of clear liquids:  Water  Clear broth  Juices (apple, white grape, white cranberry  and cider) without pulp  Noncarbonated, powder based beverages  (lemonade and Kris-Aid)  Sodas (Sprite, 7-Up, ginger ale and seltzer)  Coffee or tea (without milk or cream)  Gatorade    -  No Alcohol for at least 24 hours before surgery     Which medicines can I take?  Hold  Aspirin for 7 days before surgery.   Hold Multivitamins for 7 days before surgery.  Hold Supplements for 7 days before surgery.  Hold Ibuprofen (Advil, Motrin) for 1 day before surgery--unless otherwise directed by surgeon.  Hold Naproxen (Aleve) for 4 days before surgery.  -  PLEASE TAKE these medications the day of surgery:  Tylenol if needed; take morning medications.    How do I prepare myself?  - Please take 2 showers before surgery using Scrubcare or Hibiclens soap.    Use this soap only from the neck to your toes.     Leave the soap on your skin for one minute--then rinse thoroughly.      You may use your own shampoo and conditioner; no other hair products.   - Please remove all jewelry and body piercings.  - No lotions, deodorants or fragrance.  - No makeup or fingernail polish.   - Bring your ID and insurance card.    -If you have a Deep Brain Stimulator, Spinal Cord Stimulator or any neuro stimulator device---you must bring the remote control to the hospital     - All patients are required to have a Covid-19 test within 4 days of surgery/procedure.      -Patients will be contacted by the [facility] scheduling team within 1 week of surgery to make an appointment.      - Patients may call the Scheduling team at [phone] if they have not been scheduled within 4 days of  surgery.      ALL PATIENTS GOING HOME THE SAME DAY OF SURGERY ARE REQUIRED TO HAVE A RESPONSIBLE ADULT TO DRIVE AND BE IN ATTENDANCE WITH THEM FOR 24 HOURS FOLLOWING SURGERY.      Questions or Concerns:    - For any questions regarding the day of surgery or your hospital stay, please contact the Pre Admission Nursing Office at [phone].       - If you have health changes between today and your surgery please call your surgeon.       For questions after surgery please call your surgeons office.

## 2022-02-08 NOTE — TELEPHONE ENCOUNTER
Patient was evaluated by PAC and determined appropriate to schedule at the [facility]. Phoned patient to offer to move up the date of her surgery, but none of the available dates offered worked for the patient. She will remain scheduled on 3/8/22 at [place].

## 2022-02-08 NOTE — ANESTHESIA PREPROCEDURE EVALUATION
Anesthesia Pre-Procedure Evaluation    Patient: Enoch Delgado   MRN: [id number] : 2000        Preoperative Diagnosis: * No surgery found *    Procedure :   Video Visit       Past Medical History:   Diagnosis Date     ALL (acute lymphoblastic leukaemia)     very high risk (negative CNS; loss of 7q and ETV6, iAMP21 +) treated per [facility] [place] [id number]     [place] toe      PONV (postoperative nausea and vomiting)       Past Surgical History:   Procedure Laterality Date     ARTHROSCOPY KNEE WITH LATERAL MENISCECTOMY Left 2/3/2017    Procedure: ARTHROSCOPY KNEE WITH LATERAL MENISCECTOMY;  Surgeon: Vivek Manrique MD;  [facility]: [facility]     ARTHROSCOPY KNEE WITH MENISCAL REPAIR Left 12/15/2015    Procedure: ARTHROSCOPY KNEE WITH MENISCAL REPAIR;  Surgeon: Vivek Manrique MD;  Location: [facility]     BONE MARROW ASPIRATE &BIOPSY  2014          LUMBAR PUNCTURE  2014          REMOVE PORT VASCULAR ACCESS N/A 2016    Procedure: REMOVE PORT VASCULAR ACCESS;  Surgeon: Heaven Wilson MD;  [facility]      SPINAL PUNCTURE,LUMBAR, INTRATHECAL CHEMO DELIVERY N/A 2015    Procedure: SPINAL PUNCTURE,LUMBAR, INTRATHECAL CHEMO DELIVERY;  Surgeon: Jinny Charles APRN CNP;  [facility]      SPINAL PUNCTURE,LUMBAR, INTRATHECAL CHEMO DELIVERY N/A 2015    Procedure: SPINAL PUNCTURE,LUMBAR, INTRATHECAL CHEMO DELIVERY;  Surgeon: Gail Manzano MD;  [facility]      SPINAL PUNCTURE,LUMBAR, INTRATHECAL CHEMO DELIVERY N/A 2016    Procedure: SPINAL PUNCTURE,LUMBAR, INTRATHECAL CHEMO DELIVERY;  Surgeon: Jinny Charles APRN CNP;  [facility]      SPINAL PUNCTURE,LUMBAR, INTRATHECAL CHEMO DELIVERY N/A 2016    Procedure: SPINAL PUNCTURE,LUMBAR, INTRATHECAL CHEMO DELIVERY;  Surgeon: Jonny Hodgson MD;  [facility]       Allergies   Allergen Reactions     Vancomycin      Itching  and redmans syndrome     Asparaginase      4/14/14- Peg-asparaginase anaphylaxis with throat irritation and truncal itching  4/16/14- Erwinia asparaginase anaphylaxis with throat irritation, difficulty swallowing and truncal itching     Naproxen      Lip swells, does NOT occur with ibuprofen     Pentamidine Cough     Saline [Sodium Chloride] Other (See Comments)     DO NOT USE PREFILLED SYIRNGES!!!  PLEASE DRAW UP SALINE FROM VIALS AS THE PREFILLED SYRINGES MAKE HER NAUSEATED.     Mercaptopurine Other (See Comments) and Rash     Eosinophilia, on claritin with mercaptopurine      Social History     Tobacco Use     Smoking status: Passive Smoke Exposure - Never Smoker     Smokeless tobacco: Never Used     Tobacco comment: Mom smokes outside   Substance Use Topics     Alcohol use: No      Wt Readings from Last 1 Encounters:   01/10/22 91.6 kg (202 lb)        Anesthesia Evaluation   Pt has had prior anesthetic.     History of anesthetic complications  - PONV.      ROS/MED HX  ENT/Pulmonary:  - neg pulmonary ROS  (-) asthma and COPD   Neurologic:  - neg neurologic ROS     Cardiovascular:     (+) -----Previous cardiac testing   Echo: Date: 9/14/21 Results:  Normal cardiac anatomy. There is normal appearance and motion of the  tricuspid, mitral, pulmonary and aortic valves. Technically difficult study  due to poor acoustic windows. Normal right and left ventricular size and  function. Calculated single plane left ventricular ejection fraction from the  4 chamber view is 59%. No pericardial effusion.     ______________________________________________________________________________  Technical information:  A complete two dimensional, MMODE, spectral and color Doppler transthoracic  echocardiogram is performed. The study quality is fair. Technically difficult  study due to poor acoustic windows. Images are obtained from parasternal,  apical, subcostal and suprasternal notch views. Prior echocardiogram available  for  comparison. ECG tracing shows sinus tachycardia at 108 bpm.     Segmental Anatomy:  There is normal atrial arrangement, with concordant atrioventricular and  ventriculoarterial connections.     Systemic and pulmonary veins:  The systemic venous return is normal. Color flow demonstrates flow from two  pulmonary veins entering the left atrium.     Atria and atrial septum:  Normal right atrial size. The left atrium is normal in size. There is no  obvious atrial level shunting.     Atrioventricular valves:  The tricuspid valve is normal in appearance and motion. Trivial tricuspid  valve insufficiency. Insufficient jet to estimate right ventricular systolic  pressure. The mitral valve is normal in appearance and motion. Trivial mitral  valve insufficiency.     Ventricles and Ventricular Septum:  Normal right ventricular size. Normal right ventricular systolic function.  Normal left ventricular size. Normal left ventricular systolic function. The  calculated single plane left ventricular ejection fraction from the 4 chamber  view is 59 %. There is no ventricular level shunting.     Outflow tracts:  Normal great artery relationship. There is unobstructed flow through the right  ventricular outflow tract. The pulmonary valve motion is normal. There is  normal flow across the pulmonary valve. There is unobstructed flow through the  left ventricular outflow tract. Tricuspid aortic valve with normal appearance  and motion. There is normal flow across the aortic valve.     Great arteries:  The main pulmonary artery has normal appearance. There is unobstructed flow in  the main pulmonary artery. The pulmonary artery bifurcation is normal. There  is unobstructed flow in both branch pulmonary arteries. Normal ascending  aorta. The aortic arch appears normal. There is unobstructed antegrade flow in  the ascending, transverse arch, descending thoracic and abdominal aorta.     Arterial Shunts:  The ductal region is not imaged with  this study.     Coronaries:  Normal origin of the right and left proximal coronary arteries from the  corresponding sinus of Valsalva by 2D.  Stress Test: Date: Results:    ECG Reviewed: Date: Results:    Cath: Date: Results:      METS/Exercise Tolerance: >4 METS    Hematologic:     (+) history of blood transfusion, no previous transfusion reaction,  (-) history of blood clots   Musculoskeletal:       GI/Hepatic:  - neg GI/hepatic ROS     Renal/Genitourinary:  - neg Renal ROS     Endo:  - neg endo ROS     Psychiatric/Substance Use:     (+) psychiatric history depression     Infectious Disease:  - neg infectious disease ROS     Malignancy:   (+) Malignancy, History of Lymphoma/Leukemia.Lymph CA Remission status post Chemo.        Other: Comment: Pt is breastfeeding     (+) LMP: 1/1/22, ,            OUTSIDE LABS:  CBC:   Lab Results   Component Value Date    WBC 12.6 (H) 09/14/2021    WBC 9.3 03/22/2021    HGB 14.1 09/14/2021    HGB 13.7 03/22/2021    HCT 43.0 09/14/2021    HCT 41.0 03/22/2021     09/14/2021     03/22/2021     BMP:   Lab Results   Component Value Date     03/22/2021     07/30/2019    POTASSIUM 3.9 03/22/2021    POTASSIUM 4.1 07/30/2019    CHLORIDE 108 03/22/2021    CHLORIDE 107 07/30/2019    CO2 24 03/22/2021    CO2 25 07/30/2019    BUN 10 03/22/2021    BUN 11 07/30/2019    CR 0.55 03/22/2021    CR 0.51 07/30/2019     (H) 03/22/2021     (H) 07/30/2019     COAGS:   Lab Results   Component Value Date    PTT 37 05/17/2016    INR 1.01 05/17/2016    FIBR 289 12/01/2015     POC:   Lab Results   Component Value Date    HCG n 02/03/2017    HCGS Negative 05/17/2016     HEPATIC:   Lab Results   Component Value Date    ALBUMIN 3.8 03/22/2021    PROTTOTAL 7.6 03/22/2021    ALT 20 03/22/2021    AST 14 03/22/2021    ALKPHOS 104 03/22/2021    BILITOTAL 0.5 03/22/2021     OTHER:   Lab Results   Component Value Date    LACT 0.8 04/11/2015    A1C 4.9 07/30/2019    PATRIC 9.2  03/22/2021    PHOS 5.1 09/16/2014    MAG 1.7 09/16/2014    LIPASE 69 12/01/2015    AMYLASE 41 12/01/2015    CRP <2.9 04/21/2015               Janette Portillo PA-C

## 2022-02-10 DIAGNOSIS — Z11.59 ENCOUNTER FOR SCREENING FOR OTHER VIRAL DISEASES: Primary | ICD-10-CM

## 2022-03-04 ENCOUNTER — LAB (OUTPATIENT)
Dept: LAB | Facility: OTHER | Age: 22
End: 2022-03-04
Payer: MEDICAID

## 2022-03-04 DIAGNOSIS — Z11.59 ENCOUNTER FOR SCREENING FOR OTHER VIRAL DISEASES: ICD-10-CM

## 2022-03-04 PROCEDURE — U0005 INFEC AGEN DETEC AMPLI PROBE: HCPCS

## 2022-03-04 PROCEDURE — U0003 INFECTIOUS AGENT DETECTION BY NUCLEIC ACID (DNA OR RNA); SEVERE ACUTE RESPIRATORY SYNDROME CORONAVIRUS 2 (SARS-COV-2) (CORONAVIRUS DISEASE [COVID-19]), AMPLIFIED PROBE TECHNIQUE, MAKING USE OF HIGH THROUGHPUT TECHNOLOGIES AS DESCRIBED BY CMS-2020-01-R: HCPCS

## 2022-03-05 LAB — SARS-COV-2 RNA RESP QL NAA+PROBE: NEGATIVE

## 2022-03-06 ENCOUNTER — ANESTHESIA EVENT (OUTPATIENT)
Dept: SURGERY | Facility: CLINIC | Age: 22
End: 2022-03-06
Payer: MEDICAID

## 2022-03-08 ENCOUNTER — HOSPITAL ENCOUNTER (OUTPATIENT)
Facility: CLINIC | Age: 22
Discharge: HOME | End: 2022-03-08
Attending: ORTHOPAEDIC SURGERY | Admitting: ORTHOPAEDIC SURGERY
Payer: MEDICAID

## 2022-03-08 ENCOUNTER — ANESTHESIA (OUTPATIENT)
Dept: SURGERY | Facility: CLINIC | Age: 22
End: 2022-03-08
Payer: MEDICAID

## 2022-03-08 VITALS
DIASTOLIC BLOOD PRESSURE: 65 MMHG | TEMPERATURE: 98.5 F | RESPIRATION RATE: 16 BRPM | WEIGHT: 200.4 LBS | HEART RATE: 92 BPM | HEIGHT: 69 IN | BODY MASS INDEX: 29.68 KG/M2 | SYSTOLIC BLOOD PRESSURE: 122 MMHG | OXYGEN SATURATION: 97 %

## 2022-03-08 DIAGNOSIS — Q68.6 DISCOID MENISCUS OF KNEE: ICD-10-CM

## 2022-03-08 LAB — GLUCOSE BLDC GLUCOMTR-MCNC: 84 MG/DL (ref 70–99)

## 2022-03-08 PROCEDURE — 250N000011 HC RX IP 250 OP 636: Performed by: NURSE ANESTHETIST, CERTIFIED REGISTERED

## 2022-03-08 PROCEDURE — 29881 ARTHRS KNE SRG MNISECTMY M/L: CPT | Mod: RT | Performed by: ORTHOPAEDIC SURGERY

## 2022-03-08 PROCEDURE — 250N000013 HC RX MED GY IP 250 OP 250 PS 637: Performed by: PHYSICIAN ASSISTANT

## 2022-03-08 PROCEDURE — 999N000141 HC STATISTIC PRE-PROCEDURE NURSING ASSESSMENT: Performed by: ORTHOPAEDIC SURGERY

## 2022-03-08 PROCEDURE — 250N000011 HC RX IP 250 OP 636: Performed by: ANESTHESIOLOGY

## 2022-03-08 PROCEDURE — 82962 GLUCOSE BLOOD TEST: CPT

## 2022-03-08 PROCEDURE — 710N000012 HC RECOVERY PHASE 2, PER MINUTE: Performed by: ORTHOPAEDIC SURGERY

## 2022-03-08 PROCEDURE — 250N000013 HC RX MED GY IP 250 OP 250 PS 637: Performed by: ANESTHESIOLOGY

## 2022-03-08 PROCEDURE — 710N000010 HC RECOVERY PHASE 1, LEVEL 2, PER MIN: Performed by: ORTHOPAEDIC SURGERY

## 2022-03-08 PROCEDURE — 370N000017 HC ANESTHESIA TECHNICAL FEE, PER MIN: Performed by: ORTHOPAEDIC SURGERY

## 2022-03-08 PROCEDURE — 272N000001 HC OR GENERAL SUPPLY STERILE: Performed by: ORTHOPAEDIC SURGERY

## 2022-03-08 PROCEDURE — 250N000011 HC RX IP 250 OP 636

## 2022-03-08 PROCEDURE — 258N000001 HC RX 258: Performed by: ORTHOPAEDIC SURGERY

## 2022-03-08 PROCEDURE — 360N000076 HC SURGERY LEVEL 3, PER MIN: Performed by: ORTHOPAEDIC SURGERY

## 2022-03-08 PROCEDURE — 250N000009 HC RX 250: Performed by: NURSE ANESTHETIST, CERTIFIED REGISTERED

## 2022-03-08 PROCEDURE — 258N000003 HC RX IP 258 OP 636: Performed by: NURSE ANESTHETIST, CERTIFIED REGISTERED

## 2022-03-08 PROCEDURE — 250N000009 HC RX 250: Performed by: ANESTHESIOLOGY

## 2022-03-08 PROCEDURE — 250N000009 HC RX 250: Performed by: ORTHOPAEDIC SURGERY

## 2022-03-08 RX ORDER — HYDRALAZINE HYDROCHLORIDE 20 MG/ML
2.5-5 INJECTION INTRAMUSCULAR; INTRAVENOUS EVERY 10 MIN PRN
Status: DISCONTINUED | OUTPATIENT
Start: 2022-03-08 | End: 2022-03-08 | Stop reason: HOSPADM

## 2022-03-08 RX ORDER — DEXMEDETOMIDINE HYDROCHLORIDE 4 UG/ML
INJECTION, SOLUTION INTRAVENOUS PRN
Status: DISCONTINUED | OUTPATIENT
Start: 2022-03-08 | End: 2022-03-08

## 2022-03-08 RX ORDER — SODIUM CHLORIDE, SODIUM LACTATE, POTASSIUM CHLORIDE, CALCIUM CHLORIDE 600; 310; 30; 20 MG/100ML; MG/100ML; MG/100ML; MG/100ML
INJECTION, SOLUTION INTRAVENOUS CONTINUOUS
Status: DISCONTINUED | OUTPATIENT
Start: 2022-03-08 | End: 2022-03-08 | Stop reason: HOSPADM

## 2022-03-08 RX ORDER — CEFAZOLIN SODIUM/WATER 2 G/20 ML
2 SYRINGE (ML) INTRAVENOUS
Status: COMPLETED | OUTPATIENT
Start: 2022-03-08 | End: 2022-03-08

## 2022-03-08 RX ORDER — FENTANYL CITRATE 50 UG/ML
25 INJECTION, SOLUTION INTRAMUSCULAR; INTRAVENOUS
Status: DISCONTINUED | OUTPATIENT
Start: 2022-03-08 | End: 2022-03-08 | Stop reason: HOSPADM

## 2022-03-08 RX ORDER — OXYCODONE HYDROCHLORIDE 5 MG/1
5 TABLET ORAL
Status: COMPLETED | OUTPATIENT
Start: 2022-03-08 | End: 2022-03-08

## 2022-03-08 RX ORDER — HYDROMORPHONE HYDROCHLORIDE 1 MG/ML
0.2 INJECTION, SOLUTION INTRAMUSCULAR; INTRAVENOUS; SUBCUTANEOUS EVERY 5 MIN PRN
Status: DISCONTINUED | OUTPATIENT
Start: 2022-03-08 | End: 2022-03-08 | Stop reason: HOSPADM

## 2022-03-08 RX ORDER — ONDANSETRON 4 MG/1
4 TABLET, ORALLY DISINTEGRATING ORAL EVERY 30 MIN PRN
Status: DISCONTINUED | OUTPATIENT
Start: 2022-03-08 | End: 2022-03-08 | Stop reason: HOSPADM

## 2022-03-08 RX ORDER — LIDOCAINE HYDROCHLORIDE 20 MG/ML
INJECTION, SOLUTION INFILTRATION; PERINEURAL PRN
Status: DISCONTINUED | OUTPATIENT
Start: 2022-03-08 | End: 2022-03-08

## 2022-03-08 RX ORDER — DEXAMETHASONE SODIUM PHOSPHATE 4 MG/ML
INJECTION, SOLUTION INTRA-ARTICULAR; INTRALESIONAL; INTRAMUSCULAR; INTRAVENOUS; SOFT TISSUE PRN
Status: DISCONTINUED | OUTPATIENT
Start: 2022-03-08 | End: 2022-03-08

## 2022-03-08 RX ORDER — ACETAMINOPHEN 325 MG/1
650 TABLET ORAL
Status: DISCONTINUED | OUTPATIENT
Start: 2022-03-08 | End: 2022-03-08 | Stop reason: HOSPADM

## 2022-03-08 RX ORDER — CEFAZOLIN SODIUM/WATER 2 G/20 ML
2 SYRINGE (ML) INTRAVENOUS SEE ADMIN INSTRUCTIONS
Status: DISCONTINUED | OUTPATIENT
Start: 2022-03-08 | End: 2022-03-08 | Stop reason: HOSPADM

## 2022-03-08 RX ORDER — ONDANSETRON 2 MG/ML
INJECTION INTRAMUSCULAR; INTRAVENOUS PRN
Status: DISCONTINUED | OUTPATIENT
Start: 2022-03-08 | End: 2022-03-08

## 2022-03-08 RX ORDER — PROPOFOL 10 MG/ML
INJECTION, EMULSION INTRAVENOUS CONTINUOUS PRN
Status: DISCONTINUED | OUTPATIENT
Start: 2022-03-08 | End: 2022-03-08

## 2022-03-08 RX ORDER — FENTANYL CITRATE 50 UG/ML
INJECTION, SOLUTION INTRAMUSCULAR; INTRAVENOUS PRN
Status: DISCONTINUED | OUTPATIENT
Start: 2022-03-08 | End: 2022-03-08

## 2022-03-08 RX ORDER — BUPIVACAINE HYDROCHLORIDE AND EPINEPHRINE 2.5; 5 MG/ML; UG/ML
INJECTION, SOLUTION INFILTRATION; PERINEURAL PRN
Status: DISCONTINUED | OUTPATIENT
Start: 2022-03-08 | End: 2022-03-08 | Stop reason: HOSPADM

## 2022-03-08 RX ORDER — METOPROLOL TARTRATE 1 MG/ML
1-2 INJECTION, SOLUTION INTRAVENOUS EVERY 5 MIN PRN
Status: DISCONTINUED | OUTPATIENT
Start: 2022-03-08 | End: 2022-03-08 | Stop reason: HOSPADM

## 2022-03-08 RX ORDER — FENTANYL CITRATE 50 UG/ML
50 INJECTION, SOLUTION INTRAMUSCULAR; INTRAVENOUS
Status: DISCONTINUED | OUTPATIENT
Start: 2022-03-08 | End: 2022-03-08 | Stop reason: HOSPADM

## 2022-03-08 RX ORDER — ONDANSETRON 2 MG/ML
4 INJECTION INTRAMUSCULAR; INTRAVENOUS EVERY 30 MIN PRN
Status: DISCONTINUED | OUTPATIENT
Start: 2022-03-08 | End: 2022-03-08 | Stop reason: HOSPADM

## 2022-03-08 RX ORDER — FENTANYL CITRATE 50 UG/ML
25 INJECTION, SOLUTION INTRAMUSCULAR; INTRAVENOUS EVERY 5 MIN PRN
Status: DISCONTINUED | OUTPATIENT
Start: 2022-03-08 | End: 2022-03-08 | Stop reason: HOSPADM

## 2022-03-08 RX ORDER — DEXAMETHASONE SODIUM PHOSPHATE 10 MG/ML
INJECTION, SOLUTION INTRAMUSCULAR; INTRAVENOUS
Status: COMPLETED | OUTPATIENT
Start: 2022-03-08 | End: 2022-03-08

## 2022-03-08 RX ORDER — PROPOFOL 10 MG/ML
INJECTION, EMULSION INTRAVENOUS PRN
Status: DISCONTINUED | OUTPATIENT
Start: 2022-03-08 | End: 2022-03-08

## 2022-03-08 RX ORDER — ACETAMINOPHEN 325 MG/1
975 TABLET ORAL ONCE
Status: DISCONTINUED | OUTPATIENT
Start: 2022-03-08 | End: 2022-03-08 | Stop reason: HOSPADM

## 2022-03-08 RX ORDER — SODIUM CHLORIDE, SODIUM LACTATE, POTASSIUM CHLORIDE, CALCIUM CHLORIDE 600; 310; 30; 20 MG/100ML; MG/100ML; MG/100ML; MG/100ML
INJECTION, SOLUTION INTRAVENOUS CONTINUOUS PRN
Status: DISCONTINUED | OUTPATIENT
Start: 2022-03-08 | End: 2022-03-08

## 2022-03-08 RX ORDER — LIDOCAINE 40 MG/G
CREAM TOPICAL
Status: DISCONTINUED | OUTPATIENT
Start: 2022-03-08 | End: 2022-03-08 | Stop reason: HOSPADM

## 2022-03-08 RX ORDER — DEXMEDETOMIDINE HYDROCHLORIDE 4 UG/ML
INJECTION, SOLUTION INTRAVENOUS
Status: COMPLETED | OUTPATIENT
Start: 2022-03-08 | End: 2022-03-08

## 2022-03-08 RX ORDER — ACETAMINOPHEN 325 MG/1
975 TABLET ORAL ONCE
Status: COMPLETED | OUTPATIENT
Start: 2022-03-08 | End: 2022-03-08

## 2022-03-08 RX ORDER — OXYCODONE HYDROCHLORIDE 5 MG/1
5 TABLET ORAL EVERY 4 HOURS PRN
Status: DISCONTINUED | OUTPATIENT
Start: 2022-03-08 | End: 2022-03-08 | Stop reason: HOSPADM

## 2022-03-08 RX ORDER — OXYCODONE HYDROCHLORIDE 5 MG/1
5-10 TABLET ORAL EVERY 4 HOURS PRN
Qty: 12 TABLET | Refills: 0 | Status: SHIPPED | OUTPATIENT
Start: 2022-03-08

## 2022-03-08 RX ORDER — BUPIVACAINE HYDROCHLORIDE 2.5 MG/ML
INJECTION, SOLUTION EPIDURAL; INFILTRATION; INTRACAUDAL
Status: COMPLETED | OUTPATIENT
Start: 2022-03-08 | End: 2022-03-08

## 2022-03-08 RX ADMIN — PROPOFOL 200 MG: 10 INJECTION, EMULSION INTRAVENOUS at 11:19

## 2022-03-08 RX ADMIN — DEXMEDETOMIDINE HYDROCHLORIDE 8 MCG: 4 INJECTION, SOLUTION INTRAVENOUS at 11:37

## 2022-03-08 RX ADMIN — DEXAMETHASONE SODIUM PHOSPHATE 4 MG: 4 INJECTION, SOLUTION INTRAMUSCULAR; INTRAVENOUS at 11:19

## 2022-03-08 RX ADMIN — ACETAMINOPHEN 975 MG: 325 TABLET, FILM COATED ORAL at 09:59

## 2022-03-08 RX ADMIN — PROPOFOL 150 MCG/KG/MIN: 10 INJECTION, EMULSION INTRAVENOUS at 11:20

## 2022-03-08 RX ADMIN — FENTANYL CITRATE 25 MCG: 50 INJECTION, SOLUTION INTRAMUSCULAR; INTRAVENOUS at 13:19

## 2022-03-08 RX ADMIN — FENTANYL CITRATE 50 MCG: 50 INJECTION, SOLUTION INTRAMUSCULAR; INTRAVENOUS at 10:27

## 2022-03-08 RX ADMIN — OXYCODONE HYDROCHLORIDE 5 MG: 5 TABLET ORAL at 13:23

## 2022-03-08 RX ADMIN — Medication 2 G: at 11:19

## 2022-03-08 RX ADMIN — FENTANYL CITRATE 25 MCG: 50 INJECTION, SOLUTION INTRAMUSCULAR; INTRAVENOUS at 12:51

## 2022-03-08 RX ADMIN — HYDROMORPHONE HYDROCHLORIDE 0.2 MG: 1 INJECTION, SOLUTION INTRAMUSCULAR; INTRAVENOUS; SUBCUTANEOUS at 13:27

## 2022-03-08 RX ADMIN — LIDOCAINE HYDROCHLORIDE 80 MG: 20 INJECTION, SOLUTION INFILTRATION; PERINEURAL at 11:12

## 2022-03-08 RX ADMIN — PROPOFOL 30 MG: 10 INJECTION, EMULSION INTRAVENOUS at 11:38

## 2022-03-08 RX ADMIN — ONDANSETRON 4 MG: 2 INJECTION INTRAMUSCULAR; INTRAVENOUS at 12:35

## 2022-03-08 RX ADMIN — DEXMEDETOMIDINE HYDROCHLORIDE 12 MCG: 4 INJECTION, SOLUTION INTRAVENOUS at 11:30

## 2022-03-08 RX ADMIN — SODIUM CHLORIDE, POTASSIUM CHLORIDE, SODIUM LACTATE AND CALCIUM CHLORIDE: 600; 310; 30; 20 INJECTION, SOLUTION INTRAVENOUS at 11:12

## 2022-03-08 RX ADMIN — BUPIVACAINE HYDROCHLORIDE 20 ML: 2.5 INJECTION, SOLUTION EPIDURAL; INFILTRATION; INTRACAUDAL at 10:30

## 2022-03-08 RX ADMIN — PROPOFOL 50 MG: 10 INJECTION, EMULSION INTRAVENOUS at 11:22

## 2022-03-08 RX ADMIN — FENTANYL CITRATE 25 MCG: 50 INJECTION, SOLUTION INTRAMUSCULAR; INTRAVENOUS at 12:18

## 2022-03-08 RX ADMIN — DEXAMETHASONE SODIUM PHOSPHATE 2 MG: 10 INJECTION, SOLUTION INTRAMUSCULAR; INTRAVENOUS at 10:30

## 2022-03-08 RX ADMIN — DEXMEDETOMIDINE 20 MCG: 100 INJECTION, SOLUTION, CONCENTRATE INTRAVENOUS at 10:30

## 2022-03-08 NOTE — ANESTHESIA PROCEDURE NOTES
Airway       Patient location during procedure: OR       Procedure Start/Stop Times: 3/8/2022 11:24 AM and 3/8/2022 11:24 AM  Staff -        CRNA: Imelda Carreon APRN CRNA       Other Anesthesia Staff: Lexis Denney       Performed By: SRNA  Consent for Airway        Urgency: elective  Indications and Patient Condition       Indications for airway management: mina-procedural       Induction type:intravenous       Mask difficulty assessment: 1 - vent by mask    Final Airway Details       Final airway type: supraglottic airway    Supraglottic Airway Details        Type: LMA       Brand: Air-Q       LMA size: 4.5    Post intubation assessment        Placement verified by: capnometry, equal breath sounds and chest rise        Number of attempts at approach: 1       Secured with: silk tape       Ease of procedure: easy       Dentition: Intact and Unchanged

## 2022-03-08 NOTE — ANESTHESIA PREPROCEDURE EVALUATION
Anesthesia Pre-Procedure Evaluation    Patient: Enoch Delgado   MRN: [id number] : 2000        Procedure : Procedure(s):  Examination Under Anesthesia, Knee Arthroscopy Right  Meniscectomy and Saucerization, Possible Repair          Past Medical History:   Diagnosis Date     ALL (acute lymphoblastic leukaemia)     very high risk (negative CNS; loss of 7q and ETV6, iAMP21 +) [place] [facility] [place] [id number]     [place] toe      PONV (postoperative nausea and vomiting)       Past Surgical History:   Procedure Laterality Date     ARTHROSCOPY KNEE WITH LATERAL MENISCECTOMY Left 2/3/2017    Procedure: ARTHROSCOPY KNEE WITH LATERAL MENISCECTOMY;  Surgeon: Vivek Manrique MD;  [facility]     ARTHROSCOPY KNEE WITH MENISCAL REPAIR Left 12/15/2015    Procedure: ARTHROSCOPY KNEE WITH MENISCAL REPAIR;  Surgeon: Vivek Manrique MD;  [facility]     BONE MARROW ASPIRATE &BIOPSY  2014          LUMBAR PUNCTURE  2014          REMOVE PORT VASCULAR ACCESS N/A 2016    Procedure: REMOVE PORT VASCULAR ACCESS;  Surgeon: Heaven Wilson MD;  [facility]      SPINAL PUNCTURE,LUMBAR, INTRATHECAL CHEMO DELIVERY N/A 2015    Procedure: SPINAL PUNCTURE,LUMBAR, INTRATHECAL CHEMO DELIVERY;  Surgeon: Jinny Charles APRN CNP;  [facility]      SPINAL PUNCTURE,LUMBAR, INTRATHECAL CHEMO DELIVERY N/A 2015    Procedure: SPINAL PUNCTURE,LUMBAR, INTRATHECAL CHEMO DELIVERY;  Surgeon: Gail Manznao MD;  [facility]      SPINAL PUNCTURE,LUMBAR, INTRATHECAL CHEMO DELIVERY N/A 2016    Procedure: SPINAL PUNCTURE,LUMBAR, INTRATHECAL CHEMO DELIVERY;  Surgeon: Jinny Charles APRN CNP;  [facility]      SPINAL PUNCTURE,LUMBAR, INTRATHECAL CHEMO DELIVERY N/A 2016    Procedure: SPINAL PUNCTURE,LUMBAR, INTRATHECAL CHEMO DELIVERY;  Surgeon: Jonny Hodgson MD;  [facility]        Allergies   Allergen Reactions     Vancomycin      Itching and redmans syndrome     Asparaginase      4/14/14- Peg-asparaginase anaphylaxis with throat irritation and truncal itching  4/16/14- Erwinia asparaginase anaphylaxis with throat irritation, difficulty swallowing and truncal itching     Naproxen      Lip swells, does NOT occur with ibuprofen     Pentamidine Cough     Saline [Sodium Chloride] Other (See Comments)     DO NOT USE PREFILLED SYIRNGES!!!  PLEASE DRAW UP SALINE FROM VIALS AS THE PREFILLED SYRINGES MAKE HER NAUSEATED.     Mercaptopurine Other (See Comments) and Rash     Eosinophilia, on claritin with mercaptopurine      Social History     Tobacco Use     Smoking status: Passive Smoke Exposure - Never Smoker     Smokeless tobacco: Never Used     Tobacco comment: Mom smokes outside   Substance Use Topics     Alcohol use: No      Wt Readings from Last 1 Encounters:   03/08/22 90.9 kg (200 lb 6.4 oz)        Anesthesia Evaluation   Pt has had prior anesthetic. Type: General.    History of anesthetic complications  - PONV.      ROS/MED HX  ENT/Pulmonary:  - neg pulmonary ROS     Neurologic:  - neg neurologic ROS     Cardiovascular:  - neg cardiovascular ROS  (-) murmur   METS/Exercise Tolerance: >4 METS    Hematologic:     (+) history of blood transfusion, no previous transfusion reaction, Known PRBC Anitbodies:No     Musculoskeletal:  - neg musculoskeletal ROS     GI/Hepatic:  - neg GI/hepatic ROS     Renal/Genitourinary:  - neg Renal ROS     Endo:  - neg endo ROS     Psychiatric/Substance Use:  - neg psychiatric ROS     Infectious Disease:  - neg infectious disease ROS     Malignancy:   (+) Malignancy, History of Lymphoma/Leukemia.Lymph CA Remission status post Chemo.        Other: Comment: Breast feeding - neg other ROS   (-) Any chance pregnant       Physical Exam    Airway        Mallampati: I   TM distance: > 3 FB   Neck ROM: full   Mouth opening: > 3 cm    Respiratory Devices and Support          Dental         B=Bridge, C=Chipped, L=Loose, M=Missing    Cardiovascular          Rhythm and rate: regular and normal (-) no murmur    Pulmonary   pulmonary exam normal        breath sounds clear to auscultation           OUTSIDE LABS:  CBC:   Lab Results   Component Value Date    WBC 12.6 (H) 09/14/2021    WBC 9.3 03/22/2021    HGB 14.1 09/14/2021    HGB 13.7 03/22/2021    HCT 43.0 09/14/2021    HCT 41.0 03/22/2021     09/14/2021     03/22/2021     BMP:   Lab Results   Component Value Date     03/22/2021     07/30/2019    POTASSIUM 3.9 03/22/2021    POTASSIUM 4.1 07/30/2019    CHLORIDE 108 03/22/2021    CHLORIDE 107 07/30/2019    CO2 24 03/22/2021    CO2 25 07/30/2019    BUN 10 03/22/2021    BUN 11 07/30/2019    CR 0.55 03/22/2021    CR 0.51 07/30/2019    GLC 84 03/08/2022     (H) 03/22/2021     COAGS:   Lab Results   Component Value Date    PTT 37 05/17/2016    INR 1.01 05/17/2016    FIBR 289 12/01/2015     POC:   Lab Results   Component Value Date    HCG n 02/03/2017    HCGS Negative 05/17/2016     HEPATIC:   Lab Results   Component Value Date    ALBUMIN 3.8 03/22/2021    PROTTOTAL 7.6 03/22/2021    ALT 20 03/22/2021    AST 14 03/22/2021    ALKPHOS 104 03/22/2021    BILITOTAL 0.5 03/22/2021     OTHER:   Lab Results   Component Value Date    LACT 0.8 04/11/2015    A1C 4.9 07/30/2019    PATRIC 9.2 03/22/2021    PHOS 5.1 09/16/2014    MAG 1.7 09/16/2014    LIPASE 69 12/01/2015    AMYLASE 41 12/01/2015    CRP <2.9 04/21/2015       Anesthesia Plan    ASA Status:  2   NPO Status:  NPO Appropriate    Anesthesia Type: General.     - Airway: LMA   Induction: Intravenous, Propofol.   Maintenance: TIVA.        Consents    Anesthesia Plan(s) and associated risks, benefits, and realistic alternatives discussed. Questions answered and patient/representative(s) expressed understanding.    - Discussed:     - Discussed with:  Patient      - Specific Concerns: sore throat, post op pain/nausea,  dental damage, rare major complications. Risks of block including bleeding, infection and damage to surrounding structures. .     - Extended Intubation/Ventilatory Support Discussed: No.      - Patient is DNR/DNI Status: No    Use of blood products discussed: No .     Postoperative Care    Pain management: IV analgesics, Peripheral nerve block (Single Shot), Multi-modal analgesia.   PONV prophylaxis: Ondansetron (or other 5HT-3), Background Propofol Infusion, Dexamethasone or Solumedrol     Comments:    Other Comments: Currently breastfeeding. Discussed that unless certain medications (particularly ketamine and demerol) are given that there is no need to discard pumped milk today. Prior history of ALL in remission. Two prior knee surgeries without complication other than PONV with first. Had block with second and had much more tolerable post op so would prefer block today. Discussed plan for general anesthetic with LMA and preop adductor canal block. Given breastfeeding will attempt to limit polypharmacy as much as possible. Patient voiced understanding of plan and had no further questions.             Marty Van MD

## 2022-03-08 NOTE — ANESTHESIA PROCEDURE NOTES
Adductor canal Procedure Note    Pre-Procedure   Staff -        Anesthesiologist:  Marty Van MD       Performed By: anesthesiologist       Location: pre-op       Procedure Start/Stop Times: 3/8/2022 10:25 AM and 3/8/2022 10:30 AM       Pre-Anesthestic Checklist: patient identified, IV checked, site marked, risks and benefits discussed, informed consent, monitors and equipment checked, pre-op evaluation, at physician/surgeon's request and post-op pain management  Timeout:       Correct Patient: Yes        Correct Procedure: Yes        Correct Site: Yes        Correct Position: Yes        Correct Laterality: Yes        Site Marked: Yes  Procedure Documentation  Procedure: Adductor canal       Diagnosis: POST OP PAIN PER SURGEON REQUEST       Laterality: right       Patient Position: supine       Skin prep: Chloraprep       Local skin infiltrated with 3 mL of 1% lidocaine.        Needle Type: short bevel and insulated       Needle Gauge: 21.        Ultrasound guided       1. Ultrasound was used to identify targeted nerve, plexus, vascular marker, or fascial plane and place a needle adjacent to it in real-time.       2. Ultrasound was used to visualize the spread of anesthetic in close proximity to the above referenced structure.       3. A permanent image is entered into the patient's record.       4. The visualized anatomic structures appeared normal.       5. There were no apparent abnormal pathologic findings.    Assessment/Narrative         The placement was negative for: blood aspirated, painful injection and site bleeding       Paresthesias: No.     Bolus given via needle..        Secured via.        Insertion/Infusion Method: Single Shot       Complications: none       Injection made incrementally with aspirations every 2 mL.    Medication(s) Administered   Bupivacaine 0.25% PF (Infiltration), 20 mL  Dexmedetomidine 4 mcg/mL (Perineural), 20 mcg  Dexamethasone 10 mg/mL PF (Perineural), 2 mg  Medication  Administration Time: 3/8/2022 10:30 AM

## 2022-03-08 NOTE — ANESTHESIA POSTPROCEDURE EVALUATION
Patient: Enoch Delgado    Procedure: Procedure(s):  Arthroscopy Knee With Meniscectomy and lateral meniscus saucerization       Anesthesia Type:  General    Note:  Disposition: Outpatient   Postop Pain Control: Uneventful            Sign Out: Well controlled pain   PONV: No   Neuro/Psych: Uneventful            Sign Out: Acceptable/Baseline neuro status   Airway/Respiratory: Uneventful            Sign Out: Acceptable/Baseline resp. status   CV/Hemodynamics: Uneventful            Sign Out: Acceptable CV status; No obvious hypovolemia; No obvious fluid overload   Other NRE: NONE   DID A NON-ROUTINE EVENT OCCUR? No           Last vitals:  Vitals Value Taken Time   /61 03/08/22 1300   Temp     Pulse 75 03/08/22 1307   Resp 19 03/08/22 1307   SpO2 99 % 03/08/22 1307   Vitals shown include unvalidated device data.    Electronically Signed By: Iban Duffy DO  March 8, 2022  1:07 PM

## 2022-03-08 NOTE — ANESTHESIA CARE TRANSFER NOTE
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    Anesthesia Type:   General     Note:    Oropharynx: oropharynx clear of all foreign objects and spontaneously breathing  Level of Consciousness: awake and drowsy  Oxygen Supplementation: face mask  Level of Supplemental Oxygen (L/min / FiO2): 8  Independent Airway: airway patency satisfactory and stable  Dentition: dentition unchanged  Vital Signs Stable: post-procedure vital signs reviewed and stable  Report to RN Given: handoff report given  Patient transferred to: PACU    Handoff Report: Identifed the Patient, Identified the Reponsible Provider, Reviewed the pertinent medical history, Discussed the surgical course, Reviewed Intra-OP anesthesia mangement and issues during anesthesia, Set expectations for post-procedure period and Allowed opportunity for questions and acknowledgement of understanding      Vitals:  Vitals Value Taken Time   /74    Temp 36.3    Pulse 88 03/08/22 1249   Resp 20 03/08/22 1249   SpO2 100 % 03/08/22 1249   Vitals shown include unvalidated device data.    Electronically Signed By: KAMAR Morocho CRNA  March 8, 2022  12:50 PM

## 2022-03-08 NOTE — DISCHARGE INSTRUCTIONS
Postoperative Instructions-Arthroscopic Meniscectomy, Chondroplasty, or Debridment  Vivek Manrique  [place] [id number] [address] [address]                                    A.  COMFORT:  1. Elevation - Elevate your knee and ankle above the level of your heart.  The best position is lying down with two pillows lengthwise under your entire leg.  This should be done for the first several days after surgery.  To the extent possible, you should NOT place a pillow under your knee, as we want to encourage full extension at the knee.  2. Swelling - An ice pack will be provided to control swelling and discomfort.  Once the initial dressing is removed, place a thin towel between your skin and the ice pack.  3. Pain Medication - Take medication as prescribed, but only as often as necessary.  Avoid alcohol and driving if you are taking pain medication.     4. Driving -  Driving is NOT permitted for 6 weeks following right knee surgery.     B.  ACTIVITIES:  1.    Range of Motion - Move your knee through range of motion as tolerated. This must be done while sitting or lying down.  2.    Exercises - Point and flex your feet, move your ankle around in big circles, and wiggle your toes (or write the alphabet with your big toe) to help prevent complications such as blood clotting in your legs. Thigh muscle tightening exercises should begin the day of surgery and should be done for 10 to 15 minutes, 3 times a day, for the first few weeks after surgery.  Straight leg raising can be done starting the day after surgery.  3.    Weight bearing status - You are allowed to put all of your weight on your operative leg. You may use crutches if needed until you can walk without limping.  4.    Physical therapy - PT may be initiated after your first postoperative appointment. Call and arrange an appointment if not already done.  5.    Athletic activities - Athletic activities, such as swimming, bicycling,  jogging, running and stop-and-go-sports should be avoided until 6 weeks postop.  6.  Return to Work - Return to work as soon as possible.  Your ability to work depends on a number of factors - your level of discomfort and how much demand your job puts on your knees.  If you have any questions, please call your doctor.    C. INCISION CARE:    1.    Keep the initial post-op dressing on, clean and dry for the first 3 days after surgery. You may then remove the dressing and shower.  2.    The steri-strips (small white tape that is directly on the incision areas) should be left on until they fall off or are removed at your first office visit. You may apply band-aids directly to the small incisions around your knee after showering.   3.    Tub bathing, swimming, and soaking of the knee should be avoided until allowed by your doctor - usually 3 weeks after your surgery.     D. CALL YOUR PHYSICIAN IF:  1.    Pain in your knee persists or worsens in the first few days after surgery.  2.    Excessive redness or drainage of cloudy or bloody material from the wounds (clear red tinted fluid and some mild drainage should be expected). Drainage of any kind 5 days after surgery should be reported to the doctor.  3.    You have a temperature elevation greater than 101.5    4.    You have pain, swelling or redness in your calf.  5.    You have numbness or weakness in your leg or foot.    E.  RETURN TO THE OFFICE:  Your first return to our office should be approximately one week after your surgery.  Call your physician s office to make an appointment for this first post-operative visit if not already scheduled.        Safety Tips for Using Crutches    Crutch Fit:    Assume good standing posture with shoulders relaxed and crutch tips 6-8 inches out from the side of the foot.    The underarm pad should fall 2-3 fingers width below the armpit.    The handgrip is positioned level with the wrist to allow 30  flexion at the elbow.    Safety  "Tips:    Bear weight on your hands, not on your armpits.    Do not add extra padding to the underarm pad. This will, in effect, lengthen the crutches and increase risk of nerve injury.    Wear flat, properly fitting shoes. Do not walk in stocking feet, high heels or slippers.    Household hazards:  --Throw rugs should be removed from floors.  --Stairs should be cleared of obstacles.  --Use extra caution on slippery, highly polished, littered or uneven floor surfaces.  --Check for electric cords.    Check crutch tips for excessive wear and keep wing nuts tight.    While walking, look forward with  head up  and  eyes open.  Take equal length steps.    Use BOTH crutches.    Stairs Sequence:    UP: \"Good\" leg first, followed by  bad  leg, then crutches.    DOWN: Crutches, followed by  bad  leg, \"good\" leg.     Walking with Crutches:    Move both crutches forward at the same time.    Non-Weight Bearing (NWB):  Hold the involved leg up and swing through the crutches with the involved leg. The involved leg does not touch the floor.    Toe Touch Weight Bearing (TTWB): Move the involved leg forward. Rest it lightly on the floor for balance only. Step through the crutches with the uninvolved leg.    Partial Weight Bearing (PWB): Move the involved leg forward. Step down the weight of the leg only.  Step through the crutches with the uninvolved leg.    Weight Bearing As Tolerated (WBAT): Move the involved leg forward. Put as much pressure through the involved leg as you can tolerate comfortably. Then step through the crutches with the uninvolved leg.    Rev. 4/201    Same-Day Surgery   Adult Discharge Orders & Instructions     For 24 hours after surgery:  1. Get plenty of rest.  A responsible adult must stay with you for at least 24 hours after you leave the hospital.   2. Pain medication can slow your reflexes. Do not drive or use heavy equipment.  If you have weakness or tingling, don't drive or use heavy equipment until this " feeling goes away.  3. Mixing alcohol and pain medication can cause dizziness and slow your breathing. It can even be fatal. Do not drink alcohol while taking pain medication.  4. Avoid strenuous or risky activities.  Ask for help when climbing stairs.   5. You may feel lightheaded.  If so, sit for a few minutes before standing.  Have someone help you get up.   6. If you have nausea (feel sick to your stomach), drink only clear liquids such as apple juice, ginger ale, broth or 7-Up.  Rest may also help.  Be sure to drink enough fluids.  Move to a regular diet as you feel able. Take pain medications with a small amount of solid food, such as toast or crackers, to avoid nausea.   7. A slight fever is normal. Call the doctor if your fever is over 100 F (37.7 C) (taken under the tongue) or lasts longer than 24 hours.  8. You may have a dry mouth, muscle aches, trouble sleeping or a sore throat.  These symptoms should go away after 24 hours.  9. Do not make important or legal decisions.   Pain Management:      1. Take pain medication (if prescribed) for pain as directed by your physician.        2. WARNING: If the pain medication you have been prescribed contains Tylenol  (acetaminophen), DO NOT take additional doses of Tylenol (acetaminophen).     Call your doctor for any of the followin.  Signs of infection (fever, growing tenderness at the surgery site, severe pain, a large amount of drainage or bleeding, foul-smelling drainage, redness, swelling).    2.  It has been over 8 to 10 hours since surgery and you are still not able to urinate (pee).    3.  Headache for over 24 hours.    4.  Numbness, tingling or weakness the day after surgery (if you had spinal anesthesia).  To contact a doctor, call Dr. Manrique, [facility]: [phone] or:      [phone] and ask for the Resident On Call for:          Orthopedic Surgery (answered 24 hours a day)      Emergency Department:  [facility]: [phone]  [facility]: [phone]           02-Dec-2021 17:52

## 2022-03-08 NOTE — OP NOTE
PREOPERATIVE DIAGNOSIS:   1. Right knee discoid lateral meniscus  2. Horizontal lateral meniscus tear    POSTOPERATIVE DIAGNOSIS:  1. Right knee discoid lateral meniscus  2. Horizontal lateral meniscus tear    PROCEDURE:  1. Examination under anesthesia right knee  2. Right knee arthroscopy  3. Saucerization partial meniscectomy discoid lateral meniscus right knee    DATE OF SURGERY: 3/8/2022    SURGEON: Vivek Manrique MD    ASSISTANT: Cm Strickland PA-C. The Assistance of Ms. Strickland was necessary for patient positioning, arthroscopic visualization, retraction, and meniscectomy and saucerization of discoid meniscus.     RESIDENT OR FELLOW: Danna Camacho MD    OPERATIVE INDICATIONS: Enoch Delgado is a pleasant 21 year old who I saw through my orthopedic clinic with a history, physical, imaging consistent with right discoid meniscus.  She is well-known to me.  She is status post saucerization of the left discoid meniscus.  She is done well in the side.  Had a new event.  Immediate pain.  MRI confirmed a large discoid meniscus which we already knew was present as well as a horizontal tear.  I discussed with her saucerization/meniscectomy.  I told her would be prepared to perform a repair.  She desired to proceed..  I reviewed with the patient the risks, benefits, complications, techniques and alternatives to surgery.  We reviewed the expected course of recovery and the potential expected outcomes.  The patient understood both the risks and benefits and desired to proceed despite the risks.    OPERATIVE DETAILS: In the preoperative area the patient's informed consent was reviewed and they desired to proceed.  The right leg was marked and the patient was in agreement.  The patient was taken to the operating room where a timeout was performed and all parties were in agreement.  Preoperative antibiotics were given within 1 hour of the time of incision.  The patient was placed in the supine position and  surrendered to LMA anesthesia.  No tourniquet was applied.  Egg crate was placed beneath the well leg and a side post was utilized.  The operative leg was prepped and draped in the usual sterile fashion.     Examination Under Anesthesia:    Range of motion 0 to 135 degrees.  Stable varus valgus stress testing.  Stable posterior drawer testing.  Stable anterior drawer.  Lachman 0.    Anterior medial and anterolateral arthroscopic portals recruited diagnostic arthroscopy was performed with findings: There were no loose bodies in the suprapatellar pouch, medial gutter, lateral gutter.  Medial femoral condyle, medial tibial plateau, medial meniscus normal.  ACL PCL normal.  Medial patella facet central ridge lateral patella facet central trochlea normal.  Lateral femoral condyle, lateral tibial plateau normal.  Large discoid meniscus covering the entire lateral compartment.  Considerable thickness of the meniscus.    At this time a biter was introduced and a saucerization was initiated.  After getting through the medial rim we clearly identified this large horizontal tear through the midportion of the lateral meniscus.  The undersurface leaflet was then excised and we reshape the meniscus to a more normal shape.  Biters and montrell were used and an accessory lateral portal was created to further allow us to complete this meniscectomy.    Visualization confirmed good stability of the meniscus to the posterior capsule as well as the lateral wall.  Probing showed stability.  After definitive saucerization we removed all extra meniscal debris    Copious irrigation was performed an a layered closure was initiated, sterile dressings were applied and the patient was transferred to the recovery room in stable condition with stable vital signs.    ESTIMATED BLOOD LOSS: 5 mL.    TOURNIQUET TIME: No tourniquet was placed.    COMPLICATIONS: None apparent.    DRAINS: None.    SPECIMENS: None.     POSTOPERATIVE  PLAN:  1. Weightbearing as tolerated  2. Range of motion as tolerated  3. No running jumping pounding sports for 6 weeks  4. Follow-up in my clinic 1 week

## 2022-03-08 NOTE — LETTER
[facility]  [address]  [place] [facility] [address]  Phone: [phone]    March 8, 2022        Enoch DODD Delgado  [address] [facility] [address]          To whom it may concern:    RE: Enoch Kendall had surgery at [facility] today, March 8th. Please excuse her from work until after her follow up appointment.    Please contact our clinic at [phone] for questions or concerns.      Sincerely,      Dr. Manrique

## 2022-03-14 ENCOUNTER — THERAPY VISIT (OUTPATIENT)
Dept: PHYSICAL THERAPY | Facility: CLINIC | Age: 22
End: 2022-03-14
Attending: ORTHOPAEDIC SURGERY
Payer: MEDICAID

## 2022-03-14 DIAGNOSIS — Z98.890 H/O KNEE SURGERY: ICD-10-CM

## 2022-03-14 DIAGNOSIS — R60.0 LOCALIZED EDEMA: ICD-10-CM

## 2022-03-14 DIAGNOSIS — R26.9 IMPAIRED GAIT: ICD-10-CM

## 2022-03-14 DIAGNOSIS — M25.561 ACUTE PAIN OF RIGHT KNEE: ICD-10-CM

## 2022-03-14 DIAGNOSIS — Q68.6 DISCOID MENISCUS OF KNEE: ICD-10-CM

## 2022-03-14 PROCEDURE — 97140 MANUAL THERAPY 1/> REGIONS: CPT | Mod: GP | Performed by: PHYSICAL THERAPIST

## 2022-03-14 PROCEDURE — 97161 PT EVAL LOW COMPLEX 20 MIN: CPT | Mod: GP | Performed by: PHYSICAL THERAPIST

## 2022-03-14 PROCEDURE — 97110 THERAPEUTIC EXERCISES: CPT | Mod: GP | Performed by: PHYSICAL THERAPIST

## 2022-03-14 ASSESSMENT — ACTIVITIES OF DAILY LIVING (ADL)
STAND: ACTIVITY IS MINIMALLY DIFFICULT
HOW_WOULD_YOU_RATE_THE_OVERALL_FUNCTION_OF_YOUR_KNEE_DURING_YOUR_USUAL_DAILY_ACTIVITIES?: ABNORMAL
PAIN: THE SYMPTOM AFFECTS MY ACTIVITY MODERATELY
RISE FROM A CHAIR: ACTIVITY IS SOMEWHAT DIFFICULT
GIVING WAY, BUCKLING OR SHIFTING OF KNEE: THE SYMPTOM AFFECTS MY ACTIVITY SLIGHTLY
GO DOWN STAIRS: ACTIVITY IS FAIRLY DIFFICULT
SQUAT: ACTIVITY IS VERY DIFFICULT
AS_A_RESULT_OF_YOUR_KNEE_INJURY,_HOW_WOULD_YOU_RATE_YOUR_CURRENT_LEVEL_OF_DAILY_ACTIVITY?: SEVERELY ABNORMAL
WEAKNESS: THE SYMPTOM AFFECTS MY ACTIVITY MODERATELY
GO UP STAIRS: ACTIVITY IS VERY DIFFICULT
LIMPING: THE SYMPTOM AFFECTS MY ACTIVITY SEVERELY
KNEE_ACTIVITY_OF_DAILY_LIVING_SUM: 24
SIT WITH YOUR KNEE BENT: I AM UNABLE TO DO THE ACTIVITY
SWELLING: THE SYMPTOM AFFECTS MY ACTIVITY MODERATELY
STIFFNESS: THE SYMPTOM AFFECTS MY ACTIVITY MODERATELY
HOW_WOULD_YOU_RATE_THE_CURRENT_FUNCTION_OF_YOUR_KNEE_DURING_YOUR_USUAL_DAILY_ACTIVITIES_ON_A_SCALE_FROM_0_TO_100_WITH_100_BEING_YOUR_LEVEL_OF_KNEE_FUNCTION_PRIOR_TO_YOUR_INJURY_AND_0_BEING_THE_INABILITY_TO_PERFORM_ANY_OF_YOUR_USUAL_DAILY_ACTIVITIES?: 30
KNEE_ACTIVITY_OF_DAILY_LIVING_SCORE: 34.29
KNEEL ON THE FRONT OF YOUR KNEE: I AM UNABLE TO DO THE ACTIVITY
RAW_SCORE: 24
WALK: ACTIVITY IS VERY DIFFICULT

## 2022-03-14 NOTE — PROGRESS NOTES
[facility]    OUTPATIENT Physical Therapy ORTHOPEDIC EVALUATION  PLAN OF TREATMENT FOR OUTPATIENT REHABILITATION  (COMPLETE FOR INITIAL CLAIMS ONLY)  Patient's Last Name, First Name, M.I.  YOB: 2000  Enoch Delgado    Provider s Name:  [facility]   Medical Record No.  [id number]   Start of Care Date:  03/14/22   Onset Date:  03/08/2203/08/22   Type:     _X__PT   ___OT Medical Diagnosis:    Encounter Diagnoses   Name Primary?     Discoid meniscus of knee      Acute pain of right knee      H/O knee surgery      Impaired gait      Localized edema         Treatment Diagnosis:  S/P R Meniscectomy        Goals:     03/14/22 0500   Body Part   Goals listed below are for R Knee   Goal #1   Goal #1 ambulation   Previous Functional Level No restrictions   Current Functional Level Minutes patient can walk;with 2 crutches;PWB   Performance Level 10 minutes   STG Target Performance Minutes patient will be able to walk;without assistive device   Performance Level 30 minutes   Rationale for safe household ambulation;for safe community ambulation   Due Date 04/04/22    LTG Target Performance Minutes patient will be able to  walk;without assistive device;Ambulate without gait deviation   Performance Level 2 hours   Rationale for safe community ambulation;for safe work place ambulation   Due Date 05/02/22   Goal #2   Goal #2 stairs   Previous Functional Level No restrictions   Current Functional Level with the following number of stairs   Performance Level 0   STG Target Performance Ascend/descend stairs;in a normal reciprocal pattern;with a railing;with the following number of stairs   Performance Level 10   Rationale for safe community access to buildings;for safe community ambulaton   Due Date 04/04/22   LTG Target Performance Ascend/descend stairs;in a normal reciprocal  pattern;without a railing;with the following number of stairs   Performance Level 20   Rationale for safe community access to buildings;for safe community ambulaton   Due Date 05/02/22       Therapy Frequency:  1x/week  Predicted Duration of Therapy Intervention:  10 weeks    Liam Garzon, PT                 I CERTIFY THE NEED FOR THESE SERVICES FURNISHED UNDER        THIS PLAN OF TREATMENT AND WHILE UNDER MY CARE     (Physician co-signature of this document indicates review and certification of the therapy plan).                       Certification Date From:  03/14/22   Certification Date To:  05/23/22    Referring Provider:  Vivek Nevarez*    Initial Assessment        See Epic Evaluation SOC Date: 03/14/22

## 2022-03-14 NOTE — PROGRESS NOTES
Physical Therapy Initial Evaluation  Subjective:  The history is provided by the patient. No  was used.   Patient Health History  Enoch Delgado being seen for S/P Meniscectomy.     Problem began: 3/8/2022.   Problem occurred: Meniscus tear in April 2021, fell while hiking   Pain is reported as 7/10 on pain scale.  General health as reported by patient is excellent.  Pertinent medical history includes: cancer and depression. Other medical history details: 3 meniscus tears; 2 L, 1 R.   Red flags:  None as reported by patient.  Medical allergies: other. Other medical allergies details: Naproxen, vancomycin, asparaginase.   Surgeries include:  Orthopedic surgery and other. Other surgery history details: Port placement & removal.    Current medications:  Anti-depressants and pain medication.    Current occupation is PCA.   Primary job tasks include:  Lifting/carrying and prolonged standing.                  Therapist Generated HPI Evaluation  Problem details: Initial injury happened while hiking. Pt fell walking up stairs April 2021. After surgery, pt only took pain meds for first 2 days and now takes iburprofen prn. Has been putting some weight on the leg but feeling a pull in the front. Pain only with prolonged activity or when pulling the leg up to put shoes or clothes on. Is currently living with mom and 2 yr old son. Pt has been performing leg lifts and heel slides at home. 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         Type of problem:  Right knee.    This is a new condition.  Condition occurred with:  Other reason (Sugery).  Where condition occurred: other.  Patient reports pain:  Anterior.  Pain is described as stabbing and sharp and is constant.  Pain radiates to:  Other.   Since onset symptoms are gradually improving.  Associated symptoms:  Edema, loss of motion/stiffness, numbness and loss of  strength. Symptoms are exacerbated by activity  and relieved by ice, NSAID's and other (oxy).  Special tests included:  Other (None).  Previous treatment includes surgery.   Restrictions due to condition include:  Currently not working due to present treatment.  Barriers include:  Stairs and other (Dressing/shoes is diffult and can be painful at times).                        Objective:  System:    Physical Exam    General     ROS     Enoch Delgado , : 2000, MRN: [id number]    Physical Therapy Objective Findings  Subjective information, goals, clinical impression, daily documentation and other information found in EPISODES tab.  Knee Objective Findings    Gait:  WBAT R with B crutches    Visual Inspection: good healing, no increased redness or warmth, - homans, good distal pulses    Edema:                                                                         Right                                                  Left                                                      Circumferential Superior Pole - Patella 44cm 42.5cm   Circumferential Inferior Pole - Patella 40cm 40cm            Range of Motion:                                     Right AROM            Right PROM            Left AROM              Left PROM                Flexion  90     Extension  9 deg away from full extension     Other:         Manual Muscle Testing  (graded 0-5, measured at 0 degrees unless otherwise noted):                                                                       Right                                                  Left                                                     Ham Set     Quad Set 10 deg ext lag    VMO          Other:     (+ mild pain, ++ moderate pain, +++ severe pain)    Special Tests:                                                                    Right                                                   Left                                                       Step Test Height           -Control  Comment     Functional Squat (deg.)     OTHER:       Flexibility:                                                                   Right                                                   Left                                                      Gastroc     Soleus     Hamstrings     Hip Flexors     Quadricep     ITB            Patellar Mobility                                                               Right                                                  Left                                                      Static Position normal    Passive Mobility normal    Dynamic Mobility normal      Palpation: mild [id number] incisions      Assessment/Plan:    Patient is a 21 year old female with right side knee complaints.    Patient has the following significant findings with corresponding treatment plan.                Diagnosis 1:  S/p R knee scope    Pain -  hot/cold therapy, manual therapy, self management, education and home program  Decreased ROM/flexibility - manual therapy, therapeutic exercise, therapeutic activity and home program  Decreased strength - therapeutic exercise, therapeutic activities and home program  Edema - vasopneumatics, cold therapy, cryocuff and self management/home program  Impaired gait - gait training, assistive devices and home program  Decreased function - therapeutic activities and home program    Therapy Evaluation Codes:   1) History comprised of:   Personal factors that impact the plan of care:      Past/current experiences, Profession and Time since onset of symptoms.    Comorbidity factors that impact the plan of care are:      Depression.     Medications impacting care: Anti-depressant, Anti-inflammatory and Pain.  2) Examination of Body Systems comprised of:   Body structures and functions that impact the plan of care:      Knee.   Activity limitations that impact the plan of care are:      Bathing, Driving, Dressing, Sports, Squatting/kneeling, Stairs, Walking, Working and  Sleeping.  3) Clinical presentation characteristics are:   Stable/Uncomplicated.  4) Decision-Making    Low complexity using standardized patient assessment instrument and/or measureable assessment of functional outcome.  Cumulative Therapy Evaluation is: Low complexity.    Previous and current functional limitations:  (See Goal Flow Sheet for this information)    Short term and Long term goals: (See Goal Flow Sheet for this information)     Communication ability:  Patient appears to be able to clearly communicate and understand verbal and written communication and follow directions correctly.  Treatment Explanation - The following has been discussed with the patient:   RX ordered/plan of care  Anticipated outcomes  Possible risks and side effects  This patient would benefit from PT intervention to resume normal activities.   Rehab potential is good.    Frequency:  1 X week, once daily  Duration:  for 10 weeks  Discharge Plan:  Achieve all LTG.  Independent in home treatment program.  Reach maximal therapeutic benefit.    Please refer to the daily flowsheet for treatment today, total treatment time and time spent performing 1:1 timed codes.     Liam Garzon,PT, DPT, OCS  Brandon Norman SPT

## 2022-03-15 ENCOUNTER — TELEPHONE (OUTPATIENT)
Dept: ORTHOPEDICS | Facility: CLINIC | Age: 22
End: 2022-03-15

## 2022-03-15 NOTE — TELEPHONE ENCOUNTER
[facility] Call Center    Phone Message    May a detailed message be left on voicemail: yes     Reason for Call Please call Patient about Post Op appt. Nothing avail till April  Action Taken: Message routed to:  [facility] ([facility]): charles    Travel Screening: Not Applicable

## 2022-03-21 ENCOUNTER — OFFICE VISIT (OUTPATIENT)
Dept: ORTHOPEDICS | Facility: CLINIC | Age: 22
End: 2022-03-21
Payer: MEDICAID

## 2022-03-21 VITALS — HEIGHT: 69 IN | BODY MASS INDEX: 29.62 KG/M2 | WEIGHT: 200 LBS

## 2022-03-21 DIAGNOSIS — Q68.6 DISCOID MENISCUS OF KNEE: Primary | ICD-10-CM

## 2022-03-21 PROCEDURE — 99024 POSTOP FOLLOW-UP VISIT: CPT | Performed by: ORTHOPAEDIC SURGERY

## 2022-03-21 RX ORDER — OXYCODONE HYDROCHLORIDE 5 MG/1
5 TABLET ORAL EVERY 6 HOURS PRN
Qty: 10 TABLET | Refills: 0 | Status: SHIPPED | OUTPATIENT
Start: 2022-03-21

## 2022-03-21 NOTE — PROGRESS NOTES
Orthopedic Clinic Note    Diagnoses:     1. Right knee discoid lateral meniscus  2. Horizontal lateral meniscus tear    Surgery (3/8/22):     1. Examination under anesthesia right knee  2. Right knee arthroscopy  3. Saucerization partial meniscectomy discoid lateral meniscus right knee    Subjective: The patient is now 2 weeks s/p the above mentioned surgery. Pain overall well controlled. Has not completely weaned from narcotics. Noted a spot of drainage distal to her lateral portal. Denies fever/chills. Continues to use crutches.    Exam: Healing portal sites with no drainage or erythema around the knee. Minimal swelling of the knee. ROM deferred.     Assessment: 2 weeks s/p right knee arthroscopic saucerization and partial meniscectomy of right discoid lateral meniscus. Doing well.    Plan:    -Continue WBAT and ROMAT RLE  -Continue PT  -Will provide 1 more narcotic refill  -Work note to keep patient out of work until 6 week visit  -F/u 6 week post-op for clinical re-check    The patient was seen and discussed with Dr. Manrique who agrees with the above assessment and plan.    Anitha Yee MD  Orthopedic Surgery, PGY5

## 2022-03-21 NOTE — LETTER
3/21/2022         RE: Enoch Delgado  [address] [facility] [address]        Dear Colleague,    Thank you for referring your patient, Enoch Delgado, to the [facility] [place]. Please see a copy of my visit note below.    Orthopedic Clinic Note    Diagnoses:     1. Right knee discoid lateral meniscus  2. Horizontal lateral meniscus tear    Surgery (3/8/22):     1. Examination under anesthesia right knee  2. Right knee arthroscopy  3. Saucerization partial meniscectomy discoid lateral meniscus right knee    Subjective: The patient is now 2 weeks s/p the above mentioned surgery. Pain overall well controlled. Has not completely weaned from narcotics. Noted a spot of drainage distal to her lateral portal. Denies fever/chills. Continues to use crutches.    Exam: Healing portal sites with no drainage or erythema around the knee. Minimal swelling of the knee. ROM deferred.     Assessment: 2 weeks s/p right knee arthroscopic saucerization and partial meniscectomy of right discoid lateral meniscus. Doing well.    Plan:    -Continue WBAT and ROMAT RLE  -Continue PT  -Will provide 1 more narcotic refill  -Work note to keep patient out of work until 6 week visit  -F/u 6 week post-op for clinical re-check    The patient was seen and discussed with Dr. Manrique who agrees with the above assessment and plan.    Anitha Yee MD  Orthopedic Surgery, PGY5      Patient seen and examined with the resident.     Assesment: 1 week following discoid meniscus saucerization/meniscectomy      Plan: Weightbearing as tolerated    Range of motion as tolerated    Sutures removed in clinic    Leave steri-strips in place until they fall off    OK to shower allowing water to run over incision    No soaking, scrubbing, baths, or lake for 1 additional week    Continue PT as scheduled     Pain medications reviewed and no refills required.     Operative report provided and arthroscopic images  reviewed    Follow up at 6 weeks from the date of surgery with no new X-Rays needed       I agree with history, physical and imaging as well as the assessment and plan as detailed by Dr. Yee.         Again, thank you for allowing me to participate in the care of your patient.        Sincerely,        Vivek Manrique MD

## 2022-03-21 NOTE — LETTER
Return to Work  2022     Seen today: yes    Patient:  Enoch Delgado  :   2000  MRN:     [id number]  Physician: VICKIE MANRIQUE      To whom it may concern:      Enoch Delgado is under my professional care following right knee surgery. She should be excused from work until her 6 week follow up appointment. At that appointment her work restrictions will be reassessed. Please contact my office with any questions or concerns.          Electronically signed by Vickie Manrique MD

## 2022-03-21 NOTE — LETTER
Date:March 22, 2022      Patient was self referred, no letter generated. Do not send.        [facility] Health Information

## 2022-03-21 NOTE — PROGRESS NOTES
Patient seen and examined with the resident.     Assesment: 1 week following discoid meniscus saucerization/meniscectomy      Plan: Weightbearing as tolerated    Range of motion as tolerated    Sutures removed in clinic    Leave steri-strips in place until they fall off    OK to shower allowing water to run over incision    No soaking, scrubbing, baths, or lake for 1 additional week    Continue PT as scheduled     Pain medications reviewed and no refills required.     Operative report provided and arthroscopic images reviewed    Follow up at 6 weeks from the date of surgery with no new X-Rays needed       I agree with history, physical and imaging as well as the assessment and plan as detailed by Dr. Yee.

## 2022-03-23 ENCOUNTER — THERAPY VISIT (OUTPATIENT)
Dept: PHYSICAL THERAPY | Facility: CLINIC | Age: 22
End: 2022-03-23
Payer: MEDICAID

## 2022-03-23 DIAGNOSIS — M25.561 ACUTE PAIN OF RIGHT KNEE: ICD-10-CM

## 2022-03-23 DIAGNOSIS — R26.9 IMPAIRED GAIT: ICD-10-CM

## 2022-03-23 DIAGNOSIS — Z98.890 H/O KNEE SURGERY: ICD-10-CM

## 2022-03-23 DIAGNOSIS — R60.0 LOCALIZED EDEMA: ICD-10-CM

## 2022-03-23 PROCEDURE — 97140 MANUAL THERAPY 1/> REGIONS: CPT | Mod: GP | Performed by: PHYSICAL THERAPY ASSISTANT

## 2022-03-23 PROCEDURE — 97110 THERAPEUTIC EXERCISES: CPT | Mod: GP | Performed by: PHYSICAL THERAPY ASSISTANT

## 2022-03-28 ENCOUNTER — THERAPY VISIT (OUTPATIENT)
Dept: PHYSICAL THERAPY | Facility: CLINIC | Age: 22
End: 2022-03-28
Attending: ORTHOPAEDIC SURGERY
Payer: MEDICAID

## 2022-03-28 DIAGNOSIS — M25.561 ACUTE PAIN OF RIGHT KNEE: ICD-10-CM

## 2022-03-28 DIAGNOSIS — R60.0 LOCALIZED EDEMA: ICD-10-CM

## 2022-03-28 DIAGNOSIS — R26.9 IMPAIRED GAIT: ICD-10-CM

## 2022-03-28 DIAGNOSIS — Z98.890 H/O KNEE SURGERY: ICD-10-CM

## 2022-03-28 PROCEDURE — 97530 THERAPEUTIC ACTIVITIES: CPT | Mod: GP | Performed by: PHYSICAL THERAPIST

## 2022-03-28 PROCEDURE — 97110 THERAPEUTIC EXERCISES: CPT | Mod: GP | Performed by: PHYSICAL THERAPIST

## 2022-03-28 PROCEDURE — 97140 MANUAL THERAPY 1/> REGIONS: CPT | Mod: GP | Performed by: PHYSICAL THERAPIST

## 2022-04-04 ENCOUNTER — THERAPY VISIT (OUTPATIENT)
Dept: PHYSICAL THERAPY | Facility: CLINIC | Age: 22
End: 2022-04-04
Payer: MEDICAID

## 2022-04-04 DIAGNOSIS — R60.0 LOCALIZED EDEMA: ICD-10-CM

## 2022-04-04 DIAGNOSIS — R26.9 IMPAIRED GAIT: ICD-10-CM

## 2022-04-04 DIAGNOSIS — M25.561 ACUTE PAIN OF RIGHT KNEE: ICD-10-CM

## 2022-04-04 DIAGNOSIS — Z98.890 H/O KNEE SURGERY: ICD-10-CM

## 2022-04-04 PROCEDURE — 97110 THERAPEUTIC EXERCISES: CPT | Mod: GP | Performed by: PHYSICAL THERAPIST

## 2022-04-04 PROCEDURE — 97530 THERAPEUTIC ACTIVITIES: CPT | Mod: GP | Performed by: PHYSICAL THERAPIST

## 2022-04-11 ENCOUNTER — THERAPY VISIT (OUTPATIENT)
Dept: PHYSICAL THERAPY | Facility: CLINIC | Age: 22
End: 2022-04-11
Payer: MEDICAID

## 2022-04-11 DIAGNOSIS — M25.561 ACUTE PAIN OF RIGHT KNEE: Primary | ICD-10-CM

## 2022-04-11 DIAGNOSIS — R60.0 LOCALIZED EDEMA: ICD-10-CM

## 2022-04-11 DIAGNOSIS — Z98.890 H/O KNEE SURGERY: ICD-10-CM

## 2022-04-11 DIAGNOSIS — R26.9 IMPAIRED GAIT: ICD-10-CM

## 2022-04-11 PROCEDURE — 97110 THERAPEUTIC EXERCISES: CPT | Mod: GP | Performed by: PHYSICAL THERAPIST

## 2022-04-11 PROCEDURE — 97530 THERAPEUTIC ACTIVITIES: CPT | Mod: GP | Performed by: PHYSICAL THERAPIST

## 2022-04-19 ENCOUNTER — THERAPY VISIT (OUTPATIENT)
Dept: PHYSICAL THERAPY | Facility: CLINIC | Age: 22
End: 2022-04-19
Payer: MEDICAID

## 2022-04-19 DIAGNOSIS — R60.0 LOCALIZED EDEMA: ICD-10-CM

## 2022-04-19 DIAGNOSIS — R26.9 IMPAIRED GAIT: ICD-10-CM

## 2022-04-19 DIAGNOSIS — Z98.890 H/O KNEE SURGERY: ICD-10-CM

## 2022-04-19 DIAGNOSIS — M25.561 ACUTE PAIN OF RIGHT KNEE: Primary | ICD-10-CM

## 2022-04-19 PROCEDURE — 97110 THERAPEUTIC EXERCISES: CPT | Mod: GP | Performed by: PHYSICAL THERAPY ASSISTANT

## 2022-04-19 ASSESSMENT — ACTIVITIES OF DAILY LIVING (ADL)
PAIN: I HAVE THE SYMPTOM BUT IT DOES NOT AFFECT MY ACTIVITY
KNEE_ACTIVITY_OF_DAILY_LIVING_SUM: 55
GIVING WAY, BUCKLING OR SHIFTING OF KNEE: I HAVE THE SYMPTOM BUT IT DOES NOT AFFECT MY ACTIVITY
GO DOWN STAIRS: ACTIVITY IS SOMEWHAT DIFFICULT
WEAKNESS: I HAVE THE SYMPTOM BUT IT DOES NOT AFFECT MY ACTIVITY
AS_A_RESULT_OF_YOUR_KNEE_INJURY,_HOW_WOULD_YOU_RATE_YOUR_CURRENT_LEVEL_OF_DAILY_ACTIVITY?: ABNORMAL
WALK: ACTIVITY IS NOT DIFFICULT
SWELLING: I HAVE THE SYMPTOM BUT IT DOES NOT AFFECT MY ACTIVITY
STAND: ACTIVITY IS NOT DIFFICULT
KNEE_ACTIVITY_OF_DAILY_LIVING_SCORE: 78.57
STIFFNESS: THE SYMPTOM AFFECTS MY ACTIVITY SLIGHTLY
RISE FROM A CHAIR: ACTIVITY IS MINIMALLY DIFFICULT
SIT WITH YOUR KNEE BENT: ACTIVITY IS NOT DIFFICULT
HOW_WOULD_YOU_RATE_THE_CURRENT_FUNCTION_OF_YOUR_KNEE_DURING_YOUR_USUAL_DAILY_ACTIVITIES_ON_A_SCALE_FROM_0_TO_100_WITH_100_BEING_YOUR_LEVEL_OF_KNEE_FUNCTION_PRIOR_TO_YOUR_INJURY_AND_0_BEING_THE_INABILITY_TO_PERFORM_ANY_OF_YOUR_USUAL_DAILY_ACTIVITIES?: 70
RAW_SCORE: 55
HOW_WOULD_YOU_RATE_THE_OVERALL_FUNCTION_OF_YOUR_KNEE_DURING_YOUR_USUAL_DAILY_ACTIVITIES?: ABNORMAL
LIMPING: I DO NOT HAVE THE SYMPTOM
SQUAT: ACTIVITY IS SOMEWHAT DIFFICULT
GO UP STAIRS: ACTIVITY IS MINIMALLY DIFFICULT
KNEEL ON THE FRONT OF YOUR KNEE: ACTIVITY IS FAIRLY DIFFICULT

## 2022-04-20 NOTE — PROGRESS NOTES
"PROGRESS  REPORT    Progress reporting period is from 3/14/22 to 4/20/22.      SUBJECTIVE  Subjective changes noted by patient:   Pt has been seen for 6 PT sessions following R meniscectomy on 3/8/22. Patient has d/c crutches for the past 2 weeks w/o complications. She has ocassional soreness around her knee at the end of the day depending on her activity level. She has been doing her HEP with good tolerance. No complaints or concerns today.   Current Pain level: 0/10    Previous pain level was: 7/10      Changes in function:  Yes (See Goal flowsheet attached for changes in current functional level)     Adverse reaction to treatment or activity: None     OBJECTIVE  Changes noted in objective findings:    Supine heelslide: 0-146.   MMT: Ext: 5/5 Flex: 5/5, fair control with 2\" lateral step down.   Gait normal on without assistive device, reciprocal gait on stairs with railing although lacks full eccentric control with descending.   Knee ADLS has improved from 34-79%         ASSESSMENT/PLAN  Updated problem list and treatment plan: Diagnosis 1:  S/p R menisectomy    Decreased strength - therapeutic exercise, therapeutic activities and home program  Decreased proprioception - neuro re-education, therapeutic activities and home program  Decreased function - therapeutic activities and home program  STG/LTGs have been met:  Yes (See Goal flow sheet completed today.)  Progress toward STG/LTGs have been made:  Yes (See Goal flow sheet completed today.)  Assessment of Progress: The patient's condition is improving.  Patient is meeting short term goals and is progressing towards long term goals.  Self Management Plans:  Patient has been instructed in a home treatment program.  Patient  has been instructed in self management of symptoms.  I have re-evaluated this patient and find that the nature, scope, duration and intensity of the therapy is appropriate for the medical condition of the patient.  Patient continues to require " the following intervention to meet STG and LT's:  PT    Recommendations:  This patient would benefit from continued therapy.     Frequency:  2 X a month, once daily  Duration:  for 2 months      The progress note/discharge summary was written in collaboration with and reviewed by the physical therapist.    Please refer to the daily flowsheet for treatment today, total treatment time and time spent performing 1:1 timed codes.      Liam Garzon,PT, DPT, OCS

## 2022-04-25 ENCOUNTER — OFFICE VISIT (OUTPATIENT)
Dept: ORTHOPEDICS | Facility: CLINIC | Age: 22
End: 2022-04-25
Payer: MEDICAID

## 2022-04-25 VITALS — HEIGHT: 69 IN | BODY MASS INDEX: 29.62 KG/M2 | WEIGHT: 200 LBS

## 2022-04-25 DIAGNOSIS — Q68.6 DISCOID MENISCUS OF KNEE: Primary | ICD-10-CM

## 2022-04-25 PROCEDURE — 99024 POSTOP FOLLOW-UP VISIT: CPT | Mod: GC | Performed by: ORTHOPAEDIC SURGERY

## 2022-04-25 NOTE — LETTER
Return to Work  2022     Seen today: yes    Patient:  Enoch Delgado  :   2000  MRN:     [id number]  Physician: VICKIE MANRIQUE    To whom it may concern,  Enoch Delgado has been under my professional care following her right knee surgery. Starting today she may return to work but should be given the opportunity to rest, sit, stand and change positions as needed.    If any questions, please contact my office.    Sincerely,   Electronically signed by Vickie Manrique MD

## 2022-04-25 NOTE — PROGRESS NOTES
Patient seen and examined with the fellow.     Assesment: 6 weeks following saucerization, doing well    Plan: wbat  rom as tre  Transition back to desired activities  No specific restrictions  F/u prn  Will allow to return to work with no specific restricitons, will provide opportunity to sit, rest or change position as needed    I agree with history, physical and imaging as well as the assessment and plan as detailed by Dr. Jane.

## 2022-04-25 NOTE — NURSING NOTE
"Reason For Visit:   Chief Complaint   Patient presents with     Left Knee - RECHECK     DOS 3/8/2022 left knee arthroscopy and meniscectomy.      Date of surgery: 3/8/2022  Type of surgery:   PROCEDURE:  1. Examination under anesthesia right knee  2. Right knee arthroscopy  3. Saucerization partial meniscectomy discoid lateral meniscus right knee    Patient reports that her right knee pain is minimal but she feels her knee is stiff. She feels that her knee is still weak and has decreased flexion ROM.      SANE Score  Left Knee: 100%  Right Knee: 70%      Ht 1.753 m (5' 9\")   Wt 90.7 kg (200 lb)   BMI 29.53 kg/m           Allergies   Allergen Reactions     Vancomycin      Itching and redmans syndrome     Asparaginase      4/14/14- Peg-asparaginase anaphylaxis with throat irritation and truncal itching  4/16/14- Erwinia asparaginase anaphylaxis with throat irritation, difficulty swallowing and truncal itching     Naproxen      Lip swells, does NOT occur with ibuprofen     Pentamidine Cough     Mercaptopurine Other (See Comments) and Rash     Eosinophilia, on claritin with mercaptopurine       Current Outpatient Medications   Medication     acetaminophen (TYLENOL) 325 MG tablet     cholecalciferol (VITAMIN D3) 10 mcg (400 units) TABS tablet     norelgestromin-ethinyl estradiol (ORTHO EVRA) 150-35 MCG/24HR patch     oxyCODONE (ROXICODONE) 5 MG tablet     oxyCODONE (ROXICODONE) 5 MG tablet     sertraline (ZOLOFT) 50 MG tablet     No current facility-administered medications for this visit.         Tricia Rizo, ATC    "

## 2022-04-25 NOTE — LETTER
4/25/2022     RE: Enoch Delgado  [address] [facility] [address]    Dear Colleague,    Thank you for referring your patient, Enoch Delgado, to the [facility] [place]. Please see a copy of my visit note below.    Patient seen and examined with the fellow.     Assesment: 6 weeks following saucerization, doing well    Plan: wbat  rom as tre  Transition back to desired activities  No specific restrictions  F/u prn  Will allow to return to work with no specific restricitons, will provide opportunity to sit, rest or change position as needed    I agree with history, physical and imaging as well as the assessment and plan as detailed by Dr. Jane.       Orthopedic Clinic Note    Diagnoses:     1. Right knee discoid lateral meniscus  2. Horizontal lateral meniscus tear    Surgery (3/8/22):     1. Examination under anesthesia right knee  2. Right knee arthroscopy  3. Saucerization partial meniscectomy discoid lateral meniscus right knee    Subjective: The patient is now 6 weeks s/p the above mentioned surgery. Pain overall well controlled and continues to use ibuprofen as needed.  Continue with physical therapy and overall doing well.  Reports that she feels her knee is stiff sometimes.  No significant swelling.    Exam: Portal sites are well-healed.  Range of motion from -2 degrees to approximately 140 degrees.  No tenderness to the medial or lateral joint line.  Distally neurovascularly intact though with some subjective numbness over the anterior tibia.    Assessment: 6 weeks s/p right knee arthroscopic saucerization and partial meniscectomy of right discoid lateral meniscus. Doing well.    Plan:    -Continue WBAT and ROMAT RLE  -Advance activities at this point with no specific restrictions  -We will provide a work note today.  -Anticipate that her anterior tibia numbness will continue to improve over time.  -Follow-up on an as-needed basis    The patient was seen and  discussed with Dr. Manrique who agrees with the above assessment and plan.    Davis Jane MD  Orthopedic sports medicine fellow        Again, thank you for allowing me to participate in the care of your patient.        Sincerely,        Vivek Manrique MD

## 2022-04-25 NOTE — PROGRESS NOTES
Orthopedic Clinic Note    Diagnoses:     1. Right knee discoid lateral meniscus  2. Horizontal lateral meniscus tear    Surgery (3/8/22):     1. Examination under anesthesia right knee  2. Right knee arthroscopy  3. Saucerization partial meniscectomy discoid lateral meniscus right knee    Subjective: The patient is now 6 weeks s/p the above mentioned surgery. Pain overall well controlled and continues to use ibuprofen as needed.  Continue with physical therapy and overall doing well.  Reports that she feels her knee is stiff sometimes.  No significant swelling.    Exam: Portal sites are well-healed.  Range of motion from -2 degrees to approximately 140 degrees.  No tenderness to the medial or lateral joint line.  Distally neurovascularly intact though with some subjective numbness over the anterior tibia.    Assessment: 6 weeks s/p right knee arthroscopic saucerization and partial meniscectomy of right discoid lateral meniscus. Doing well.    Plan:    -Continue WBAT and ROMAT RLE  -Advance activities at this point with no specific restrictions  -We will provide a work note today.  -Anticipate that her anterior tibia numbness will continue to improve over time.  -Follow-up on an as-needed basis    The patient was seen and discussed with Dr. Manrique who agrees with the above assessment and plan.    Davis Jane MD  Orthopedic sports medicine fellow

## 2022-05-15 ENCOUNTER — HEALTH MAINTENANCE LETTER (OUTPATIENT)
Age: 22
End: 2022-05-15

## 2022-06-13 PROBLEM — R60.0 LOCALIZED EDEMA: Status: RESOLVED | Noted: 2022-03-14 | Resolved: 2022-06-13

## 2022-06-13 PROBLEM — M25.561 ACUTE PAIN OF RIGHT KNEE: Status: RESOLVED | Noted: 2022-03-14 | Resolved: 2022-06-13

## 2022-06-13 PROBLEM — R26.9 IMPAIRED GAIT: Status: RESOLVED | Noted: 2022-03-14 | Resolved: 2022-06-13

## 2022-06-13 PROBLEM — Z98.890 H/O KNEE SURGERY: Status: RESOLVED | Noted: 2022-03-14 | Resolved: 2022-06-13

## 2022-06-13 NOTE — PROGRESS NOTES
Patient did not return for further treatment and no additional progress was noted.  Please refer to the progress note and goal flowsheet completed on 04/19/22 for discharge information.    Liam Garzon,PT, DPT, OCS

## 2022-09-10 ENCOUNTER — HEALTH MAINTENANCE LETTER (OUTPATIENT)
Age: 22
End: 2022-09-10

## 2023-01-22 ENCOUNTER — HEALTH MAINTENANCE LETTER (OUTPATIENT)
Age: 23
End: 2023-01-22

## 2024-02-18 ENCOUNTER — HEALTH MAINTENANCE LETTER (OUTPATIENT)
Age: 24
End: 2024-02-18

## 2024-06-03 ENCOUNTER — TELEPHONE (OUTPATIENT)
Dept: ORTHOPEDICS | Facility: CLINIC | Age: 24
End: 2024-06-03
Payer: COMMERCIAL

## 2024-06-03 NOTE — TELEPHONE ENCOUNTER
Other: Padmini called she is having some issues with her right knee she is getting a twinge of pain on the outside of her kneecap. She fell over 1 week ago and it feels like it did when she first tore her meniscus. She would like to have some imaging done. Please contact patient to discuss .    Could we send this information to you in [place] or would you prefer to receive a phone call?:   Patient would prefer a phone call   Okay to leave a detailed message?: Yes at Cell number on file:    Telephone Information:   Mobile [phone]

## 2024-06-04 NOTE — TELEPHONE ENCOUNTER
Patient confirmed scheduled appointment:  Date: 7/31  Time: 9:20  Visit type: Return knee  Provider: Dr. Manrique

## 2024-07-30 DIAGNOSIS — M25.561 RIGHT KNEE PAIN, UNSPECIFIED CHRONICITY: Primary | ICD-10-CM

## 2024-07-31 ENCOUNTER — OFFICE VISIT (OUTPATIENT)
Dept: ORTHOPEDICS | Facility: CLINIC | Age: 24
End: 2024-07-31
Payer: COMMERCIAL

## 2024-07-31 ENCOUNTER — ANCILLARY PROCEDURE (OUTPATIENT)
Dept: GENERAL RADIOLOGY | Facility: CLINIC | Age: 24
End: 2024-07-31
Attending: ORTHOPAEDIC SURGERY
Payer: COMMERCIAL

## 2024-07-31 VITALS — BODY MASS INDEX: 31.84 KG/M2 | WEIGHT: 215 LBS | HEIGHT: 69 IN

## 2024-07-31 DIAGNOSIS — M25.561 RIGHT KNEE PAIN, UNSPECIFIED CHRONICITY: Primary | ICD-10-CM

## 2024-07-31 DIAGNOSIS — M25.561 RIGHT KNEE PAIN, UNSPECIFIED CHRONICITY: ICD-10-CM

## 2024-07-31 PROCEDURE — 99214 OFFICE O/P EST MOD 30 MIN: CPT | Performed by: ORTHOPAEDIC SURGERY

## 2024-07-31 PROCEDURE — 73562 X-RAY EXAM OF KNEE 3: CPT | Mod: RT | Performed by: RADIOLOGY

## 2024-07-31 NOTE — LETTER
7/31/2024      Enohc Delgado  [address] [place] [id number]      Dear Colleague,    Thank you for referring your patient, Enoch Delgado, to the [facility] [place]. Please see a copy of my visit note below.    DIAGNOSIS:   Discoid meniscus right knee  Discoid meniscus left knee  History of AL L    PROCEDURES:  Saucerization of discoid meniscus left knee; date of surgery 12/15/2015  Revision saucerization of discoid meniscus left knee; date of surgery 2/3/2017  Saucerization of discoid meniscus right knee; date of surgery 3/8/2022    HISTORY:  Doing well following surgery on the right side in 2022.  Had a new event in May 2024.  Fell rollerblading.  Increasing knee pain.  Still bothers her.  Cannot do the things that she wants to do.  Feels limited.  Has locking and catching.    EXAM:     General: Awake, Alert, and oriented. Articulates and communicates with a normal affect     Right lower Extremity:  Incisions well healed without evidence of infection  No post-operative effusion or ecchymosis  Range of motion 0-1 25   Lachman 0, no pivot shift.  Stable to varus valgus stress testing.  Stable anterior posterior drawer testing  Positive lateral joint line tenderness.  Positive lateral compartment Melissa's  Neurovascularly intact    IMAGING:  Radiographs of the right knee from today were independently reviewed by me and findings were discussed with the patient today. The imaging demonstrates trace joint space narrowing of the lateral compartment of the right knee.    ASSESSMENT:  Trace joint space narrowing lateral compartment of the right knee in the setting of prior saucerization of discoid meniscus right knee now with mechanical symptoms following a fall while rollerblading    PLAN:   MRI right knee.  She has failed extensive nonsurgical management with time, relative rest, oral anti-inflammatories and a home therapy program.  She has not seen lasting  improvement.    In light of this information we cannot get an MRI of her knee because I want to look at the discoid meniscus and also one of the articular surface of the more there is some early joint space narrowing    This certainly does not mean that she needs surgery but I think it does warrant an assessment given her young age.      Again, thank you for allowing me to participate in the care of your patient.        Sincerely,        Vivek Manrique MD

## 2024-07-31 NOTE — PROGRESS NOTES
DIAGNOSIS:   Discoid meniscus right knee  Discoid meniscus left knee  History of AL L    PROCEDURES:  Saucerization of discoid meniscus left knee; date of surgery 12/15/2015  Revision saucerization of discoid meniscus left knee; date of surgery 2/3/2017  Saucerization of discoid meniscus right knee; date of surgery 3/8/2022    HISTORY:  Doing well following surgery on the right side in 2022.  Had a new event in May 2024.  Fell rollerblading.  Increasing knee pain.  Still bothers her.  Cannot do the things that she wants to do.  Feels limited.  Has locking and catching.    EXAM:     General: Awake, Alert, and oriented. Articulates and communicates with a normal affect     Right lower Extremity:  Incisions well healed without evidence of infection  No post-operative effusion or ecchymosis  Range of motion 0-1 25   Lachman 0, no pivot shift.  Stable to varus valgus stress testing.  Stable anterior posterior drawer testing  Positive lateral joint line tenderness.  Positive lateral compartment Melissa's  Neurovascularly intact    IMAGING:  Radiographs of the right knee from today were independently reviewed by me and findings were discussed with the patient today. The imaging demonstrates trace joint space narrowing of the lateral compartment of the right knee.    ASSESSMENT:  Trace joint space narrowing lateral compartment of the right knee in the setting of prior saucerization of discoid meniscus right knee now with mechanical symptoms following a fall while rollerblading    PLAN:   MRI right knee.  She has failed extensive nonsurgical management with time, relative rest, oral anti-inflammatories and a home therapy program.  She has not seen lasting improvement.    In light of this information we cannot get an MRI of her knee because I want to look at the discoid meniscus and also one of the articular surface of the more there is some early joint space narrowing    This certainly does not mean that she needs surgery  but I think it does warrant an assessment given her young age.

## 2024-07-31 NOTE — NURSING NOTE
"Reason For Visit:   Chief Complaint   Patient presents with    RECHECK     DOS: 3/8/22 right knee arthroscopy with partial lateral meniscus saucerization       ?  No  Occupation: Assistant [employer]  Currently working? Yes.  Work status?  Part-time.  Date of injury: 2-3 months ago  Type of injury: Roller skating and fell onto her right knee.  Date of surgery: 3/8/22  Type of surgery:   PROCEDURE:  Examination under anesthesia right knee  Right knee arthroscopy  Saucerization partial meniscectomy discoid lateral meniscus right knee    She had a fall 2-3 months ago and has had continued pain and swelling. She report popping and cracking in the knee.     SANE Score  Left Knee: 80  Right Knee: 60    Pain Assessment  Patient Currently in Pain: Yes  0-10 Pain Scale: 6  Primary Pain Location: Knee    Ht 1.753 m (5' 9\")   Wt 97.5 kg (215 lb)   BMI 31.75 kg/m           Allergies   Allergen Reactions    Vancomycin      Itching and redmans syndrome    Asparaginase      4/14/14- Peg-asparaginase anaphylaxis with throat irritation and truncal itching  4/16/14- Erwinia asparaginase anaphylaxis with throat irritation, difficulty swallowing and truncal itching    Naproxen      Lip swells, does NOT occur with ibuprofen    Pentamidine Cough    Mercaptopurine Other (See Comments) and Rash     Eosinophilia, on claritin with mercaptopurine       Current Outpatient Medications   Medication Sig Dispense Refill    acetaminophen (TYLENOL) 325 MG tablet Take 325-650 mg by mouth every 6 hours as needed for mild pain      cholecalciferol (VITAMIN D3) 10 mcg (400 units) TABS tablet Take 10 mcg by mouth every evening       norelgestromin-ethinyl estradiol (ORTHO EVRA) 150-35 MCG/24HR patch Place 1 patch onto the skin (Patient not taking: Reported on 7/31/2024)      oxyCODONE (ROXICODONE) 5 MG tablet Take 1 tablet (5 mg) by mouth every 6 hours as needed for pain (Patient not taking: Reported on 7/31/2024) 10 tablet 0    oxyCODONE " (ROXICODONE) 5 MG tablet Take 1-2 tablets (5-10 mg) by mouth every 4 hours as needed for moderate to severe pain (Patient not taking: Reported on 7/31/2024) 12 tablet 0    sertraline (ZOLOFT) 50 MG tablet Take 150 mg by mouth every evening  (Patient not taking: Reported on 7/31/2024)       No current facility-administered medications for this visit.         Leidy Chacon, ATC

## 2024-08-16 ENCOUNTER — MYC MEDICAL ADVICE (OUTPATIENT)
Dept: ORTHOPEDICS | Facility: CLINIC | Age: 24
End: 2024-08-16

## 2024-08-22 ENCOUNTER — APPOINTMENT (OUTPATIENT)
Dept: ORTHOPEDICS | Facility: CLINIC | Age: 24
End: 2024-08-22

## 2025-01-11 ENCOUNTER — HEALTH MAINTENANCE LETTER (OUTPATIENT)
Age: 25
End: 2025-01-11

## 2025-04-02 DIAGNOSIS — C91.01 ACUTE LYMPHOBLASTIC LEUKEMIA (ALL) IN REMISSION (H): Primary | ICD-10-CM

## 2025-04-07 ENCOUNTER — TELEPHONE (OUTPATIENT)
Dept: CARDIOLOGY | Facility: CLINIC | Age: 25
End: 2025-04-07

## 2025-04-07 NOTE — TELEPHONE ENCOUNTER
LVM NEED TO R/S CPET ALL CHAMPS STUDY APPT [facility] WILL NOT BE AVAILABLE ALSO IF YOU GOT A CALL FROM [employer] YOU CAN DISREGARD - ALL OF THE COSTS WILL BE BILLED TO THE STUDY NO NEED TO BILL YOUR INSURANCE. PCB TO R/S

## 2025-04-14 ENCOUNTER — TELEPHONE (OUTPATIENT)
Dept: CARDIOLOGY | Facility: CLINIC | Age: 25
End: 2025-04-14

## 2025-04-14 NOTE — TELEPHONE ENCOUNTER
Attempted to reach again to r/s the stress test, lvsharron.    Called mom's number and asked her to ask Padmini to check her vm and/or mychart and reply to reschedule an upcoming appt.

## 2025-05-21 ENCOUNTER — TELEPHONE (OUTPATIENT)
Dept: CARDIOLOGY | Facility: CLINIC | Age: 25
End: 2025-05-21

## 2025-05-22 ENCOUNTER — TELEPHONE (OUTPATIENT)
Dept: CARDIOLOGY | Facility: CLINIC | Age: 25
End: 2025-05-22

## 2025-05-27 ENCOUNTER — TELEPHONE (OUTPATIENT)
Dept: CARDIOLOGY | Facility: CLINIC | Age: 25
End: 2025-05-27

## (undated) DEVICE — DRSG ABDOMINAL PAD UNSTERILE 8X10" WND152764B

## (undated) DEVICE — GLOVE PROTEXIS W/NEU-THERA 6.0  2D73TE60

## (undated) DEVICE — GLOVE PROTEXIS BLUE W/NEU-THERA 8.0  2D73EB80

## (undated) DEVICE — LINEN TOWEL PACK X5 5464

## (undated) DEVICE — LINEN GOWN XLG 5407

## (undated) DEVICE — TUBING ARTHROSCOPY PUMP ARTHREX AR-6410

## (undated) DEVICE — COVER CAMERA IN-LIGHT DISP LT-C02

## (undated) DEVICE — Device

## (undated) DEVICE — PAD ARMBOARD FOAM EGGCRATE COVIDEN 3114367

## (undated) DEVICE — SUCTION MANIFOLD NEPTUNE 2 SYS 4 PORT 0702-020-000

## (undated) DEVICE — DRAPE U-DRAPE 1015NSD NON-STERILE

## (undated) DEVICE — BNDG SPANDAGRIP SZ G LF BEIGE 4.5" SAG13145

## (undated) DEVICE — SOL WATER IRRIG 1000ML BOTTLE 2F7114

## (undated) DEVICE — GLOVE PROTEXIS POWDER FREE 8.0 ORTHOPEDIC 2D73ET80

## (undated) DEVICE — GLOVE PROTEXIS POWDER FREE SMT 7.0  2D72PT70X

## (undated) DEVICE — GLOVE PROTEXIS POWDER FREE SMT 8.0  2D72PT80X

## (undated) DEVICE — DRSG GAUZE 4X8"

## (undated) DEVICE — PREP DURAPREP 26ML APL 8630

## (undated) DEVICE — SOL NACL 0.9% IRRIG 3000ML BAG 2B7477

## (undated) DEVICE — BUR ARTHREX COOLCUT SABRE 4.0MMX13CM AR-8400SR

## (undated) DEVICE — ESU GROUND PAD ADULT W/CORD E7507

## (undated) DEVICE — LINEN DRAPE 54X72" 5467

## (undated) DEVICE — DECANTER VIAL 2006S

## (undated) DEVICE — GLOVE PROTEXIS BLUE W/NEU-THERA 6.5  2D73EB65

## (undated) DEVICE — NDL 22GA 1.5"

## (undated) DEVICE — STRAP KNEE/BODY 31143004

## (undated) DEVICE — SU ETHILON 3-0 PS-1 18" 1663H

## (undated) DEVICE — PACK ARTHROSCOPY CUSTOM ASC

## (undated) DEVICE — DRSG ADAPTIC 3X8" 6113

## (undated) DEVICE — GLOVE PROTEXIS BLUE W/NEU-THERA 7.0  2D73EB70

## (undated) RX ORDER — ONDANSETRON 2 MG/ML
INJECTION INTRAMUSCULAR; INTRAVENOUS
Status: DISPENSED
Start: 2017-02-03

## (undated) RX ORDER — PROPOFOL 10 MG/ML
INJECTION, EMULSION INTRAVENOUS
Status: DISPENSED
Start: 2022-03-08

## (undated) RX ORDER — FENTANYL CITRATE 50 UG/ML
INJECTION, SOLUTION INTRAMUSCULAR; INTRAVENOUS
Status: DISPENSED
Start: 2022-03-08

## (undated) RX ORDER — GABAPENTIN 300 MG/1
CAPSULE ORAL
Status: DISPENSED
Start: 2017-02-03

## (undated) RX ORDER — PROPOFOL 10 MG/ML
INJECTION, EMULSION INTRAVENOUS
Status: DISPENSED
Start: 2017-02-03

## (undated) RX ORDER — FENTANYL CITRATE-0.9 % NACL/PF 10 MCG/ML
PLASTIC BAG, INJECTION (ML) INTRAVENOUS
Status: DISPENSED
Start: 2022-03-08

## (undated) RX ORDER — DEXAMETHASONE SODIUM PHOSPHATE 10 MG/ML
INJECTION, SOLUTION INTRAMUSCULAR; INTRAVENOUS
Status: DISPENSED
Start: 2017-02-03

## (undated) RX ORDER — ONDANSETRON 2 MG/ML
INJECTION INTRAMUSCULAR; INTRAVENOUS
Status: DISPENSED
Start: 2022-03-08

## (undated) RX ORDER — OXYCODONE HYDROCHLORIDE 5 MG/1
TABLET ORAL
Status: DISPENSED
Start: 2022-03-08

## (undated) RX ORDER — CEFAZOLIN SODIUM/WATER 2 G/20 ML
SYRINGE (ML) INTRAVENOUS
Status: DISPENSED
Start: 2022-03-08

## (undated) RX ORDER — ACETAMINOPHEN 325 MG/1
TABLET ORAL
Status: DISPENSED
Start: 2017-02-03

## (undated) RX ORDER — ACETAMINOPHEN 325 MG/1
TABLET ORAL
Status: DISPENSED
Start: 2022-03-08